# Patient Record
Sex: FEMALE | Race: WHITE | Employment: OTHER | URBAN - METROPOLITAN AREA
[De-identification: names, ages, dates, MRNs, and addresses within clinical notes are randomized per-mention and may not be internally consistent; named-entity substitution may affect disease eponyms.]

---

## 2017-05-15 ENCOUNTER — HOSPITAL ENCOUNTER (OUTPATIENT)
Facility: CLINIC | Age: 77
Discharge: HOME | End: 2017-05-15
Payer: MEDICARE

## 2017-05-15 ENCOUNTER — HOSPITAL ENCOUNTER (OUTPATIENT)
Dept: CT IMAGING | Facility: CLINIC | Age: 77
Discharge: HOME | End: 2017-05-15
Payer: MEDICARE

## 2017-05-15 DIAGNOSIS — R26.9 ABNORMAL GAIT: ICD-10-CM

## 2017-05-15 LAB
ABSOLUTE EOS #: 0.1 K/UL (ref 0–0.4)
ABSOLUTE LYMPH #: 1.2 K/UL (ref 1–4.8)
ABSOLUTE MONO #: 0.3 K/UL (ref 0.1–1.2)
ALBUMIN SERPL-MCNC: 4.1 G/DL (ref 3.5–5.2)
ALBUMIN/GLOBULIN RATIO: 1.5 (ref 1–2.5)
ALP BLD-CCNC: 66 U/L (ref 35–104)
ALT SERPL-CCNC: 21 U/L (ref 5–33)
ANION GAP SERPL CALCULATED.3IONS-SCNC: 11 MMOL/L (ref 9–17)
AST SERPL-CCNC: 25 U/L
BASOPHILS # BLD: 1 %
BASOPHILS ABSOLUTE: 0 K/UL (ref 0–0.2)
BILIRUB SERPL-MCNC: 0.3 MG/DL (ref 0.3–1.2)
BUN BLDV-MCNC: 29 MG/DL (ref 8–23)
BUN/CREAT BLD: ABNORMAL (ref 9–20)
C-REACTIVE PROTEIN: 0.5 MG/L (ref 0–5)
CALCIUM SERPL-MCNC: 9.6 MG/DL (ref 8.6–10.4)
CHLORIDE BLD-SCNC: 104 MMOL/L (ref 98–107)
CO2: 27 MMOL/L (ref 20–31)
CREAT SERPL-MCNC: 1.1 MG/DL (ref 0.5–0.9)
DIFFERENTIAL TYPE: ABNORMAL
EOSINOPHILS RELATIVE PERCENT: 3 %
GFR AFRICAN AMERICAN: 59 ML/MIN
GFR NON-AFRICAN AMERICAN: 48 ML/MIN
GFR SERPL CREATININE-BSD FRML MDRD: ABNORMAL ML/MIN/{1.73_M2}
GFR SERPL CREATININE-BSD FRML MDRD: ABNORMAL ML/MIN/{1.73_M2}
GLUCOSE BLD-MCNC: 97 MG/DL (ref 70–99)
HCT VFR BLD CALC: 36.4 % (ref 36–46)
HEMOGLOBIN: 12.1 G/DL (ref 12–16)
LYMPHOCYTES # BLD: 29 %
MAGNESIUM: 2.1 MG/DL (ref 1.6–2.6)
MCH RBC QN AUTO: 32.7 PG (ref 26–34)
MCHC RBC AUTO-ENTMCNC: 33.3 G/DL (ref 31–37)
MCV RBC AUTO: 98.3 FL (ref 80–100)
MONOCYTES # BLD: 8 %
PDW BLD-RTO: 13.5 % (ref 12.5–15.4)
PLATELET # BLD: 159 K/UL (ref 140–450)
PLATELET ESTIMATE: ABNORMAL
PMV BLD AUTO: 8.9 FL (ref 6–12)
POTASSIUM SERPL-SCNC: 4.7 MMOL/L (ref 3.7–5.3)
RBC # BLD: 3.7 M/UL (ref 4–5.2)
RBC # BLD: ABNORMAL 10*6/UL
SEDIMENTATION RATE, ERYTHROCYTE: 8 MM (ref 0–20)
SEG NEUTROPHILS: 59 %
SEGMENTED NEUTROPHILS ABSOLUTE COUNT: 2.6 K/UL (ref 1.8–7.7)
SODIUM BLD-SCNC: 142 MMOL/L (ref 135–144)
TOTAL PROTEIN: 6.9 G/DL (ref 6.4–8.3)
TROPONIN INTERP: NORMAL
TROPONIN T: <0.03 NG/ML
WBC # BLD: 4.3 K/UL (ref 3.5–11)
WBC # BLD: ABNORMAL 10*3/UL

## 2017-05-15 PROCEDURE — 85025 COMPLETE CBC W/AUTO DIFF WBC: CPT

## 2017-05-15 PROCEDURE — 86140 C-REACTIVE PROTEIN: CPT

## 2017-05-15 PROCEDURE — 80053 COMPREHEN METABOLIC PANEL: CPT

## 2017-05-15 PROCEDURE — 84484 ASSAY OF TROPONIN QUANT: CPT

## 2017-05-15 PROCEDURE — 85651 RBC SED RATE NONAUTOMATED: CPT

## 2017-05-15 PROCEDURE — 83735 ASSAY OF MAGNESIUM: CPT

## 2017-05-15 PROCEDURE — 36415 COLL VENOUS BLD VENIPUNCTURE: CPT

## 2017-05-15 PROCEDURE — 70450 CT HEAD/BRAIN W/O DYE: CPT

## 2020-07-10 ENCOUNTER — OFFICE VISIT (OUTPATIENT)
Dept: UROLOGY | Age: 80
End: 2020-07-10
Payer: MEDICARE

## 2020-07-10 VITALS
WEIGHT: 213 LBS | SYSTOLIC BLOOD PRESSURE: 136 MMHG | DIASTOLIC BLOOD PRESSURE: 78 MMHG | BODY MASS INDEX: 33.43 KG/M2 | HEIGHT: 67 IN | HEART RATE: 53 BPM

## 2020-07-10 PROCEDURE — 99203 OFFICE O/P NEW LOW 30 MIN: CPT | Performed by: UROLOGY

## 2020-07-10 PROCEDURE — 99202 OFFICE O/P NEW SF 15 MIN: CPT | Performed by: UROLOGY

## 2020-07-10 NOTE — PROGRESS NOTES
Vel Spear MD  Urology Clinic Consultation / New Patient Visit    Patient:  Francisco Ya  YOB: 1940  Date: 7/12/2020    HISTORY OF PRESENT ILLNESS:   The patient is a 78 y.o. female who presents today for evaluation of the following problems: Urinary incontinence     1. Urinary incontinence with continuous leakage         Overall the problem(s) : are worsening. Associated Symptoms: No dysuria, gross hematuria. Pain Severity: Pain Score:   0 - No pain    Summary of old records:   Incontinence started 2 years ago. No precipitating factors. She has tried Vesicare in the past for 1 month without success . No symptoms of urge, frequency and stress leak. She mentions it is continuous incontinence without any warning. No history of pelvic surgeries, gynecologic malignancies and radiation in the past.    (Patient's old records, notes and chart reviewed and summarized above.)      Onset 2 years ago  Severity is described as moderate to severe. The course of symptoms over time is chronic. Alleviating factors: none  Worsening factors: Activities  Lower urinary tract symptoms: none     Urinalysis today:  No results found for this visit on 07/10/20. Last BUN and creatinine:  Lab Results   Component Value Date    BUN 29 (H) 05/15/2017     Lab Results   Component Value Date    CREATININE 1.10 (H) 05/15/2017       Imaging Reviewed during this Office Visit:   (results were independently reviewed by physician and radiology report verified)    PAST MEDICAL, FAMILY AND SOCIAL HISTORY:  Past Medical History:   Diagnosis Date    Anemia     pernicious anemia    Eczema     Glaucoma     Osteoarthritis      Past Surgical History:   Procedure Laterality Date    HERNIA REPAIR      KNEE SURGERY       No family history on file.   Outpatient Medications Marked as Taking for the 7/10/20 encounter (Office Visit) with Thuy Kebede MD   Medication Sig Dispense Refill    folic acid (FOLVITE) 1 MG tablet Take 1 mg by mouth 2 times daily.  FLUoxetine (PROZAC) 20 MG capsule Take 20 mg by mouth 2 times daily.  levothyroxine (SYNTHROID) 100 MCG tablet Take 100 mcg by mouth Daily.  simvastatin (ZOCOR) 10 MG tablet Take 10 mg by mouth nightly.  iron polysaccaride (FERREX 150 FORTE PLUS)  MG CAPS Take 1 capsule by mouth daily.  cyanocobalamin 1000 MCG/ML injection Inject 1,000 mcg into the muscle every 30 days.  hydroxychloroquine (PLAQUENIL) 200 MG tablet Take 200 mg by mouth 2 times daily.  cilostazol (PLETAL) 100 MG tablet Take 100 mg by mouth 2 times daily.  DHA-EPA-Flaxseed Oil-Vitamin E (THERA TEARS) CAPS Take  by mouth.  NONFORMULARY betamol drops, 1 drop in each eye twice a day      aspirin 81 MG tablet Take 81 mg by mouth daily. Latex; Codeine; and Vicodin [hydrocodone-acetaminophen]  Social History     Tobacco Use   Smoking Status Never Smoker   Smokeless Tobacco Never Used       Social History     Substance and Sexual Activity   Alcohol Use No       REVIEW OF SYSTEMS:  Constitutional: negative  Eyes: negative  Respiratory: negative  Cardiovascular: negative  Gastrointestinal: negative  Genitourinary: negative except for what is in HPI  Musculoskeletal: negative  Skin: negative   Neurological: negative  Hematological/Lymphatic: negative  Psychological: negative    Physical Exam:    This a 78 y.o. female      Vitals:    07/10/20 1125   BP: 136/78   Pulse: 53     Constitutional: Patient in no acute distress. Neuro: Alert and oriented to person, place and time. Psych: mood and affect normal  HEENT negative  Lungs: Respiratory effort is normal  Cardiovascular: Normal peripheral pulses  Abdomen: Soft, non-tender, non-distended   Lymphatics: No palpable lymphadenopathy. Bladder non-tender and not distended. Assessment and Plan      1.  Urinary incontinence with continuous leakage           Plan:        Continuous incontinence without stress or urge component  Cystoscopy with urodynamics next available               Dipti Medina MD  [facility]    I have discussed the care of this patient including pertinent history and exam findings, with the resident. I have seen and examined the patient and the key elements of all parts of the encounter have been performed by me. I agree with the assessment, plan and orders as documented by the resident.     She Wynne M.D, MD

## 2020-07-17 ENCOUNTER — TELEPHONE (OUTPATIENT)
Dept: UROLOGY | Age: 80
End: 2020-07-17

## 2020-08-14 ENCOUNTER — HOSPITAL ENCOUNTER (OUTPATIENT)
Dept: PREADMISSION TESTING | Age: 80
Discharge: HOME | End: 2020-08-18
Payer: MEDICARE

## 2020-08-14 PROCEDURE — U0003 INFECTIOUS AGENT DETECTION BY NUCLEIC ACID (DNA OR RNA); SEVERE ACUTE RESPIRATORY SYNDROME CORONAVIRUS 2 (SARS-COV-2) (CORONAVIRUS DISEASE [COVID-19]), AMPLIFIED PROBE TECHNIQUE, MAKING USE OF HIGH THROUGHPUT TECHNOLOGIES AS DESCRIBED BY CMS-2020-01-R: HCPCS

## 2020-08-16 LAB — SARS-COV-2, NAA: NOT DETECTED

## 2020-08-17 ENCOUNTER — TELEPHONE (OUTPATIENT)
Dept: PRIMARY CARE CLINIC | Age: 80
End: 2020-08-17

## 2020-08-18 ENCOUNTER — HOSPITAL ENCOUNTER (OUTPATIENT)
Age: 80
Setting detail: OUTPATIENT SURGERY
Discharge: HOME | End: 2020-08-18
Attending: UROLOGY | Admitting: UROLOGY
Payer: MEDICARE

## 2020-08-18 VITALS
OXYGEN SATURATION: 100 % | HEIGHT: 66 IN | WEIGHT: 212 LBS | HEART RATE: 64 BPM | RESPIRATION RATE: 20 BRPM | BODY MASS INDEX: 34.07 KG/M2 | SYSTOLIC BLOOD PRESSURE: 152 MMHG | TEMPERATURE: 97.7 F | DIASTOLIC BLOOD PRESSURE: 88 MMHG

## 2020-08-18 PROCEDURE — 2580000003 HC RX 258: Performed by: UROLOGY

## 2020-08-18 PROCEDURE — 7100000010 HC PHASE II RECOVERY - FIRST 15 MIN: Performed by: UROLOGY

## 2020-08-18 PROCEDURE — 2709999900 HC NON-CHARGEABLE SUPPLY: Performed by: UROLOGY

## 2020-08-18 PROCEDURE — 3600000012 HC SURGERY LEVEL 2 ADDTL 15MIN: Performed by: UROLOGY

## 2020-08-18 PROCEDURE — 3600000002 HC SURGERY LEVEL 2 BASE: Performed by: UROLOGY

## 2020-08-18 RX ORDER — TROSPIUM CHLORIDE ER 60 MG/1
60 CAPSULE ORAL DAILY
Qty: 30 CAPSULE | Refills: 3 | Status: SHIPPED | OUTPATIENT
Start: 2020-08-18

## 2020-08-18 RX ORDER — MAGNESIUM HYDROXIDE 1200 MG/15ML
LIQUID ORAL CONTINUOUS PRN
Status: COMPLETED | OUTPATIENT
Start: 2020-08-18 | End: 2020-08-18

## 2020-08-18 ASSESSMENT — PAIN SCALES - GENERAL: PAINLEVEL_OUTOF10: 0

## 2020-08-18 NOTE — H&P
Relationships    Social connections     Talks on phone: Not on file     Gets together: Not on file     Attends Mosque service: Not on file     Active member of club or organization: Not on file     Attends meetings of clubs or organizations: Not on file     Relationship status: Not on file    Intimate partner violence     Fear of current or ex partner: Not on file     Emotionally abused: Not on file     Physically abused: Not on file     Forced sexual activity: Not on file   Other Topics Concern    Not on file   Social History Narrative    Not on file       Family History:  History reviewed. No pertinent family history. REVIEW OF SYSTEMS:  14 point review of systems negative other than HPI      Physical Exam:    This a 78 y.o. male   Patient Vitals for the past 24 hrs:   BP Temp Temp src Pulse Resp SpO2 Height Weight   08/18/20 0636 (!) 146/87 97.2 °F (36.2 °C) Temporal 82 20 100 % 5' 6\" (1.676 m) 212 lb (96.2 kg)     Constitutional: Patient in no acute distress; Neuro: alert and oriented to person place and time. Psych: Mood and affect normal.  Skin: Normal  Lungs: Respiratory effort normal  Cardiovascular:  Normal peripheral pulses  Abdomen: Soft, non-tender, non-distended   Bladder non-tender and not distended. LE no edema/TTP    LABS:  No results for input(s): WBC, HGB, HCT, MCV, PLT in the last 72 hours. No results for input(s): NA, K, CL, CO2, PHOS, BUN, CREATININE in the last 72 hours. Invalid input(s): CA  No results found for: PSA    Additional Lab/culture results:  None     Urinalysis: No results for input(s): COLORU, PHUR, LABCAST, WBCUA, RBCUA, MUCUS, TRICHOMONAS, YEAST, BACTERIA, CLARITYU, SPECGRAV, LEUKOCYTESUR, UROBILINOGEN, Jyothi Lucille in the last 72 hours.     Invalid input(s): NITRATE, GLUCOSEUKETONESUAMORPHOUS     -----------------------------------------------------------------  Imaging Results:  None     Assessment and Plan   Impression:  There is no problem list

## 2020-08-18 NOTE — OP NOTE
FACILITY:  [address] [place] [address]      DATE:  8/18/2020     Surgeon: Marlo Jara MD    Asst.: Piotr Leon MD    Preoperative diagnosis: urinary incontinence     Postoperative diagnosis: Same    Procedure:   1. Uroflowmetry with measurement of post-void residual. - with intraoperative interpretation   2. Complex cystometrogram with calibrated equipment. 3. Voiding pressure studies.   4. Abdominal pressure calibration (measured rectally). 5. Electromyography.   6. Cystourethroscopy.   7. Pelvic exam    Anesthesia: Local    Specimen: None    Drains: None    Follow-up: In [facility] as scheduled    EBL: 0 ML      Urodynamic Evaluation (8/18/20)  Straight catheterization for Postvoid Residual: 0 mL  Uroflow Study:  Amount Voided: 68.5 mL  Time to Max Flow: 1:13.3 sec  Maximum Flow Rate: 15.7 mL/sec  Average Flow Rate: 1.9 mL/sec (measured over 1:15 sec with only short voiding phase)  CMG with Voiding Pressure Study with intraabdominal probe:  First Sensation: 8 mL  First Urge: 64 mL  Capacity: 69 mL  Interpretation: Low capacity bladder. No demonstrated MAHI. No DO demonstrated on study. Compliance approaches infinity but only small volume infused. VLPP was not measured   Leaking with Valsalva/coughing? No  UPP was not characterized  Surface EMG Normal: no- surface EMG noise throughout study - may suggest dysfunctional voiding     Cystoscopy Operative Note:  Surgeon: German Lin MD  8/18/20  Anesthesia: Urethral 2% Xylocaine  Position: Modified Lithotomy  Findings:   The patient was prepped and draped in the usual sterile fashion. The flexible cystoscope was advanced through the urethra and into the bladder. The bladder was thoroughly inspected and the following was noted:  Vagina: normal appearing vagina with normal color and discharge, no lesions. Urethral caruncle present. No overt cystocele.    Residual Urine: none  Urethra: normal appearing urethra with no masses, tenderness or lesions; good coaptation   Bladder: No tumors or CIS noted. No bladder diverticulum. There was none trabeculation noted. Squamous metaplasia present at bladder neck. Ureters: Clear efflux from both ureters. Orifices with normal configuration and location. The cystoscope was removed. The patient tolerated the procedure well.

## 2020-09-25 ENCOUNTER — OFFICE VISIT (OUTPATIENT)
Dept: UROLOGY | Age: 80
End: 2020-09-25
Payer: MEDICARE

## 2020-09-25 VITALS
WEIGHT: 215.6 LBS | BODY MASS INDEX: 34.65 KG/M2 | DIASTOLIC BLOOD PRESSURE: 83 MMHG | HEART RATE: 68 BPM | HEIGHT: 66 IN | SYSTOLIC BLOOD PRESSURE: 130 MMHG

## 2020-09-25 PROCEDURE — 4040F PNEUMOC VAC/ADMIN/RCVD: CPT | Performed by: UROLOGY

## 2020-09-25 PROCEDURE — 1123F ACP DISCUSS/DSCN MKR DOCD: CPT | Performed by: UROLOGY

## 2020-09-25 PROCEDURE — G8427 DOCREV CUR MEDS BY ELIG CLIN: HCPCS | Performed by: UROLOGY

## 2020-09-25 PROCEDURE — 0509F URINE INCON PLAN DOCD: CPT | Performed by: UROLOGY

## 2020-09-25 PROCEDURE — G8399 PT W/DXA RESULTS DOCUMENT: HCPCS | Performed by: UROLOGY

## 2020-09-25 PROCEDURE — 1036F TOBACCO NON-USER: CPT | Performed by: UROLOGY

## 2020-09-25 PROCEDURE — 1090F PRES/ABSN URINE INCON ASSESS: CPT | Performed by: UROLOGY

## 2020-09-25 PROCEDURE — 99214 OFFICE O/P EST MOD 30 MIN: CPT | Performed by: UROLOGY

## 2020-09-25 PROCEDURE — G8417 CALC BMI ABV UP PARAM F/U: HCPCS | Performed by: UROLOGY

## 2020-09-25 PROCEDURE — 99212 OFFICE O/P EST SF 10 MIN: CPT

## 2020-09-25 NOTE — PROGRESS NOTES
Mari Bragg MD  Urology Clinic follow-up    Patient:  Jie Garcia  YOB: 1940  Date: 9/25/2020    HISTORY OF PRESENT ILLNESS:   The patient is a 78 y.o. female who presents today for evaluation of the following problems: Urinary incontinence     1. Urinary incontinence with continuous leakage    2. Overactive bladder         Overall the problem(s) : are worsening. Associated Symptoms: No dysuria, gross hematuria. Pain Severity: Pain Score:   0 - No pain    Summary of old records:   Incontinence started 2 years ago. No precipitating factors. She has tried Vesicare in the past for 1 month without success . No symptoms of urge, frequency and stress leak. She mentions it is continuous incontinence without any warning. No history of pelvic surgeries, gynecologic malignancies and radiation in the past.    (Patient's old records, notes and chart reviewed and summarized above.)      Onset 2 years ago  Severity is described as moderate to severe. The course of symptoms over time is chronic. Alleviating factors: none, med did not help  Worsening factors: Activities  Lower urinary tract symptoms: none   Cystoscopy with urodynamics in August 2020 showed low capacity low compliance bladder. She has been on Sanctura 60 mg extended release since then. This has not helped at all for her. She is still leaking a lot. Without alert or urge. Urinalysis today:  No results found for this visit on 09/25/20.     Last BUN and creatinine:  Lab Results   Component Value Date    BUN 29 (H) 05/15/2017     Lab Results   Component Value Date    CREATININE 1.10 (H) 05/15/2017       Imaging Reviewed during this Office Visit:   (results were independently reviewed by physician and radiology report verified)    PAST MEDICAL, FAMILY AND SOCIAL HISTORY:  Past Medical History:   Diagnosis Date    Anemia     pernicious anemia    Anemia     Anticardiolipin antibody positive     Antiphospholipid antibody positive     Depression     Dermatitis     Eczema     Glaucoma     Hypergranulation     PG leg ulcer    Hypothyroidism     Macular degeneration     Osteoarthritis     Osteoarthritis     Primary hypercoagulable state ([facility] [address]     Pyoderma gangrenosa     ankle     Past Surgical History:   Procedure Laterality Date    CYSTOGRAPHY N/A 8/18/2020    URODYNAMICS performed by Suzanna Young MD at [address] [place]  8/18/2020    CYSTOSCOPY performed by Suzanna Young MD at [facility]      TOTAL KNEE ARTHROPLASTY Right [address]408    UMBILICAL HERNIA REPAIR       No family history on file. Outpatient Medications Marked as Taking for the 9/25/20 encounter (Office Visit) with Suzanna Young MD   Medication Sig Dispense Refill    trospium (SANCTURA) 60 MG CP24 extended release capsule Take 1 capsule by mouth daily 30 capsule 3    folic acid (FOLVITE) 1 MG tablet Take 1 mg by mouth 2 times daily.  FLUoxetine (PROZAC) 20 MG capsule Take 20 mg by mouth 2 times daily.  levothyroxine (SYNTHROID) 100 MCG tablet Take 100 mcg by mouth Daily.  simvastatin (ZOCOR) 10 MG tablet Take 10 mg by mouth nightly.  iron polysaccaride (FERREX 150 FORTE PLUS)  MG CAPS Take 1 capsule by mouth daily.  cyanocobalamin 1000 MCG/ML injection Inject 1,000 mcg into the muscle every 30 days.  hydroxychloroquine (PLAQUENIL) 200 MG tablet Take 200 mg by mouth 2 times daily.  cilostazol (PLETAL) 100 MG tablet Take 100 mg by mouth 2 times daily.  DHA-EPA-Flaxseed Oil-Vitamin E (THERA TEARS) CAPS Take  by mouth.  NONFORMULARY betamol drops, 1 drop in each eye twice a day         Latex;  Codeine; and Vicodin [hydrocodone-acetaminophen]  Social History     Tobacco Use   Smoking Status Never Smoker   Smokeless Tobacco Never Used       Social History     Substance and Sexual Activity   Alcohol Use No       REVIEW OF SYSTEMS:  Constitutional: negative  Eyes: negative  Respiratory: negative  Cardiovascular: negative  Gastrointestinal: negative  Genitourinary: negative except for what is in HPI  Musculoskeletal: negative  Skin: negative   Neurological: negative  Hematological/Lymphatic: negative  Psychological: negative    Physical Exam:    This a 78 y.o. female      Vitals:    09/25/20 1124   BP: 130/83   Pulse: 68     Constitutional: Patient in no acute distress. Neuro: Alert and oriented to person, place and time. Assessment and Plan      1. Urinary incontinence with continuous leakage    2. Overactive bladder           Plan:        Continuous incontinence without stress or urge component. Urodynamics as noted above. Third line OAB therapy: Patient has failed medications at different dosages and behavioral lifestyle modifications. Discussed risks, benefits, alternatives, possible complications and expectations of both sacral interstim modulator and cystoscopy with Botox injection. Discussed timing of each approach, efficacy, and timeline. Risks: I discussed all the risks, benefits, alternatives and possible complications or surgery as well as expectations and post-op recovery.     Patient is leaning towards Interstim, will schedule stage 1         Booker Crisostomo MD  [facility]rology        Pablo Kilgore M.D, MD

## 2020-09-29 ENCOUNTER — TELEPHONE (OUTPATIENT)
Dept: UROLOGY | Age: 80
End: 2020-09-29

## 2020-09-29 NOTE — TELEPHONE ENCOUNTER
Patient is scheduled for surgery on 10/6 at 11:15AM and for pre admit testing on 10/2 at 9:00AM.  Patient is also scheduled for covid testing on 10/2 at 11:15AM at the [facility]. Talked to patient's daughter Nolvia Lau) and gave her the dates, times and instructions. Antonio Barlow confirmed and verbalized understanding. Patient will have interstim stage 2 on 10/20. Will call Antonio Barlow once we have the time. Baldomero allan [facility] notified and confirmed.

## 2020-09-30 ENCOUNTER — TELEPHONE (OUTPATIENT)
Dept: UROLOGY | Age: 80
End: 2020-09-30

## 2020-09-30 NOTE — TELEPHONE ENCOUNTER
Patient is scheduled for Interstim 2 on 10/23 at 7:30AM and covid testing on 10/19 at 1:15PM. Talked to Lottie Gosselin and gave her the dates, times and instructions. Lottie Gosselin confirmed and verbalized. Letter mailed out today.

## 2020-10-01 RX ORDER — SODIUM CHLORIDE, SODIUM LACTATE, POTASSIUM CHLORIDE, CALCIUM CHLORIDE 600; 310; 30; 20 MG/100ML; MG/100ML; MG/100ML; MG/100ML
1000 INJECTION, SOLUTION INTRAVENOUS CONTINUOUS
Status: CANCELLED | OUTPATIENT
Start: 2020-10-01

## 2020-10-02 ENCOUNTER — HOSPITAL ENCOUNTER (OUTPATIENT)
Dept: PREADMISSION TESTING | Age: 80
Discharge: HOME | End: 2020-10-06
Payer: MEDICARE

## 2020-10-02 VITALS
RESPIRATION RATE: 16 BRPM | OXYGEN SATURATION: 99 % | HEIGHT: 65 IN | WEIGHT: 217 LBS | BODY MASS INDEX: 36.15 KG/M2 | HEART RATE: 71 BPM | TEMPERATURE: 97.5 F | DIASTOLIC BLOOD PRESSURE: 76 MMHG | SYSTOLIC BLOOD PRESSURE: 154 MMHG

## 2020-10-02 LAB
ANION GAP SERPL CALCULATED.3IONS-SCNC: 10 MMOL/L (ref 9–17)
BUN BLDV-MCNC: 35 MG/DL (ref 8–23)
CHLORIDE BLD-SCNC: 106 MMOL/L (ref 98–107)
CO2: 24 MMOL/L (ref 20–31)
CREAT SERPL-MCNC: 1.43 MG/DL (ref 0.5–0.9)
GFR AFRICAN AMERICAN: 43 ML/MIN
GFR NON-AFRICAN AMERICAN: 35 ML/MIN
GFR SERPL CREATININE-BSD FRML MDRD: ABNORMAL ML/MIN/{1.73_M2}
GFR SERPL CREATININE-BSD FRML MDRD: ABNORMAL ML/MIN/{1.73_M2}
GLUCOSE BLD-MCNC: 79 MG/DL (ref 70–99)
HCT VFR BLD CALC: 37.8 % (ref 36.3–47.1)
HEMOGLOBIN: 11.5 G/DL (ref 11.9–15.1)
MCH RBC QN AUTO: 31.6 PG (ref 25.2–33.5)
MCHC RBC AUTO-ENTMCNC: 30.4 G/DL (ref 28.4–34.8)
MCV RBC AUTO: 103.8 FL (ref 82.6–102.9)
NRBC AUTOMATED: 0 PER 100 WBC
PDW BLD-RTO: 13.5 % (ref 11.8–14.4)
PLATELET # BLD: 183 K/UL (ref 138–453)
PMV BLD AUTO: 10.4 FL (ref 8.1–13.5)
POTASSIUM SERPL-SCNC: 4.6 MMOL/L (ref 3.7–5.3)
RBC # BLD: 3.64 M/UL (ref 3.95–5.11)
SODIUM BLD-SCNC: 140 MMOL/L (ref 135–144)
WBC # BLD: 4.3 K/UL (ref 3.5–11.3)

## 2020-10-02 PROCEDURE — 82565 ASSAY OF CREATININE: CPT

## 2020-10-02 PROCEDURE — 80051 ELECTROLYTE PANEL: CPT

## 2020-10-02 PROCEDURE — 36415 COLL VENOUS BLD VENIPUNCTURE: CPT

## 2020-10-02 PROCEDURE — 82947 ASSAY GLUCOSE BLOOD QUANT: CPT

## 2020-10-02 PROCEDURE — 85027 COMPLETE CBC AUTOMATED: CPT

## 2020-10-02 PROCEDURE — 84520 ASSAY OF UREA NITROGEN: CPT

## 2020-10-02 PROCEDURE — 93005 ELECTROCARDIOGRAM TRACING: CPT | Performed by: ANESTHESIOLOGY

## 2020-10-02 RX ORDER — MELOXICAM 15 MG/1
15 TABLET ORAL NIGHTLY
COMMUNITY
Start: 2020-07-22

## 2020-10-02 NOTE — H&P
History and Physical    Pt Name: Natalia Russ  MRN: [id number]  YOB: 1940  Date of evaluation: 10/2/2020  Primary Care Physician: Verónica Whitman MD    SUBJECTIVE:   History of Chief Complaint:    Natalia Russ is a 78 y.o. female who presents for PAT appointment. Patient complains of OAB and urinary incontinence. She has failed conservative treatment. Patient has been scheduled for SACRAL NERVE STIMULATOR IMPLANT STAGE 1 and 2. Allergies  is allergic to latex; codeine; and vicodin [hydrocodone-acetaminophen]. Medications  Prior to Admission medications    Medication Sig Start Date End Date Taking? Authorizing Provider   meloxicam (MOBIC) 15 MG tablet Take 15 mg by mouth nightly 7/22/20   Historical Provider, MD   trospium (SANCTURA) 60 MG CP24 extended release capsule Take 1 capsule by mouth daily 8/18/20   Malia Leach MD   folic acid (FOLVITE) 1 MG tablet Take 1 mg by mouth 2 times daily. Historical Provider, MD   FLUoxetine (PROZAC) 20 MG capsule Take 20 mg by mouth 2 times daily. Historical Provider, MD   levothyroxine (SYNTHROID) 100 MCG tablet Take 150 mcg by mouth Daily     Historical Provider, MD   simvastatin (ZOCOR) 10 MG tablet Take 10 mg by mouth nightly. Historical Provider, MD   iron polysaccaride (FERREX 150 FORTE PLUS)  MG CAPS Take 1 capsule by mouth daily. Historical Provider, MD   cyanocobalamin 1000 MCG/ML injection Inject 1,000 mcg into the muscle every 30 days. Historical Provider, MD   hydroxychloroquine (PLAQUENIL) 200 MG tablet Take 200 mg by mouth 2 times daily. Historical Provider, MD   cilostazol (PLETAL) 100 MG tablet Take 100 mg by mouth 2 times daily. Historical Provider, MD   DHA-EPA-Flaxseed Oil-Vitamin E (THERA TEARS) CAPS Take  by mouth. Historical Provider, MD   NONFORMULARY betamol drops, 1 drop in each eye twice a day    Historical Provider, MD   aspirin 81 MG tablet Take 81 mg by mouth daily.     Historical Provider, MD information about past medical history \"I don't know\". We spoke to her daughter Tamara Dean over the phone (she is her POA)  SKIN:  Warm & dry, no rashes on exposed skin  HEENT: HEAD: Normocephalic, atraumatic        EYES:  PERRL, EOMs intact, conjunctiva clear      EARS:  Equal bilaterally, no edema or thickening, skin is intact without lumps or lesions. No discharge. NOSE:  Nares patent, septum midline, no rhinorrhea      MOUTH/THROAT:  Mucous membranes moist, tongue is pink, uvula midline,  dentures in use upper and lower   NECK:  Supple, no lymphadenopathy, full ROM  LUNGS: Respirations even and non-labored. Clear to auscultation bilaterally, no wheezes/rales/rhonchi   CARDIOVASCULAR: slight irregularity, no murmurs/rubs/gallops   ABDOMEN: soft, non-tender, non-distended, bowel sounds active x 4, obese  MUSCULOSKELETAL: Full ROM bilateral upper extremities. Full ROM bilateral lower extremities. Strength of 5/5 bilateral upper extremities. Strength 5/5 bilateral lower extremities. VASCULAR:  Brisk cap refill bilateral fingers. Radial pulse 2+ bilaterally. Dorsalis pedis pulse 2+ bilaterally. No edema BLE. Varicose veins BLE  NEUROLOGIC: CN II-XII are grossly intact. Gait not assessed.     Testing:   EKG: 10/2/20  Labs pending: drawn 10/2/2020     IMPRESSIONS:   Urinary incontinence  OAB      Diagnosis Date    Anemia     pernicious anemia    Anticardiolipin antibody positive     Antiphospholipid antibody positive     Anxiety     Colon polyps     Depression     Dermatitis     Eczema     Glaucoma     Hypergranulation     PG leg ulcer    Hypothyroidism     Macular degeneration     Memory difficulty     was not able to relay any PMH day of PAT appt, we spoke to her daughter/POA Edwardo Carson on the phone,she will be with her day of surgery    OAB (overactive bladder)     Osteoarthritis     Poor historian     Primary hypercoagulable state ([facility] [address]     Pyoderma gangrenosa     ankle    Urinary incontinence     Wears dentures     Wears glasses     Wellness examination     pcp-Dr Macedo-last visit sept 2020    Wellness examination     dermatologist-Dr Schmidt     PLANS:   SACRAL NERVE STIMULATOR IMPLANT STAGE  1 and 2    BRANDYN GERMAN- CNP  Electronically signed 10/2/2020 at 11:27 AM

## 2020-10-02 NOTE — H&P (VIEW-ONLY)
History and Physical    Pt Name: Murtaza Chen  MRN: [id number]  YOB: 1940  Date of evaluation: 10/2/2020  Primary Care Physician: Vasquez Mancuso MD    SUBJECTIVE:   History of Chief Complaint:    Murtaza Chen is a 78 y.o. female who presents for PAT appointment. Patient complains of OAB and urinary incontinence. She has failed conservative treatment. Patient has been scheduled for SACRAL NERVE STIMULATOR IMPLANT STAGE 1 and 2. Allergies  is allergic to latex; codeine; and vicodin [hydrocodone-acetaminophen]. Medications  Prior to Admission medications    Medication Sig Start Date End Date Taking? Authorizing Provider   meloxicam (MOBIC) 15 MG tablet Take 15 mg by mouth nightly 7/22/20   Historical Provider, MD   trospium (SANCTURA) 60 MG CP24 extended release capsule Take 1 capsule by mouth daily 8/18/20   Clive Casey MD   folic acid (FOLVITE) 1 MG tablet Take 1 mg by mouth 2 times daily. Historical Provider, MD   FLUoxetine (PROZAC) 20 MG capsule Take 20 mg by mouth 2 times daily. Historical Provider, MD   levothyroxine (SYNTHROID) 100 MCG tablet Take 150 mcg by mouth Daily     Historical Provider, MD   simvastatin (ZOCOR) 10 MG tablet Take 10 mg by mouth nightly. Historical Provider, MD   iron polysaccaride (FERREX 150 FORTE PLUS)  MG CAPS Take 1 capsule by mouth daily. Historical Provider, MD   cyanocobalamin 1000 MCG/ML injection Inject 1,000 mcg into the muscle every 30 days. Historical Provider, MD   hydroxychloroquine (PLAQUENIL) 200 MG tablet Take 200 mg by mouth 2 times daily. Historical Provider, MD   cilostazol (PLETAL) 100 MG tablet Take 100 mg by mouth 2 times daily. Historical Provider, MD   DHA-EPA-Flaxseed Oil-Vitamin E (THERA TEARS) CAPS Take  by mouth. Historical Provider, MD   NONFORMULARY betamol drops, 1 drop in each eye twice a day    Historical Provider, MD   aspirin 81 MG tablet Take 81 mg by mouth daily.     Historical Provider, MD Past Medical History    has a past medical history of Anemia, Anticardiolipin antibody positive, Antiphospholipid antibody positive, Anxiety, Colon polyps, Depression, Dermatitis, Eczema, Glaucoma, Hypergranulation, Hypothyroidism, Macular degeneration, Memory difficulty, OAB (overactive bladder), Osteoarthritis, Poor historian, Primary hypercoagulable state ([facility] [address] Pyoderma gangrenosa, Urinary incontinence, Wears dentures, Wears glasses, Wellness examination, and Wellness examination. Past Surgical History   has a past surgical history that includes hernia repair; knee surgery; Total knee arthroplasty (Right, 2018); Umbilical hernia repair; Cystography (N/A, 2020); Cystoscopy (2020); and Colonoscopy (). Social History   reports that she has never smoked. She has never used smokeless tobacco.    reports no history of alcohol use. reports no history of drug use. Marital Status single  Children 4  Occupation \" I don't remember\". Family History  Family Status   Relation Name Status    Mother      Father       family history includes No Known Problems in her father and mother. Review of Systems:  CONSTITUTIONAL:   negative for fevers, chills, fatigue and malaise +memory difficulties    EYES:   negative for double vision, blurred vision and photophobia    HEENT:   negative for tinnitus, epistaxis and sore throat     RESPIRATORY:   negative for cough, shortness of breath, wheezing     CARDIOVASCULAR:   negative for chest pain, palpitations, syncope, edema     GASTROINTESTINAL:   negative for nausea, vomiting     GENITOURINARY:   negative for incontinence     MUSCULOSKELETAL:   negative for neck or back pain     NEUROLOGICAL:   Negative for weakness and tingling  negative for headaches and dizziness     PSYCHIATRIC:   negative for anxiety       OBJECTIVE:   VITALS: per epic [place] and orientated to person, place and time.  But not able to offer any information about past medical history \"I don't know\". We spoke to her daughter Ambika Cali over the phone (she is her POA)  SKIN:  Warm & dry, no rashes on exposed skin  HEENT: HEAD: Normocephalic, atraumatic        EYES:  PERRL, EOMs intact, conjunctiva clear      EARS:  Equal bilaterally, no edema or thickening, skin is intact without lumps or lesions. No discharge. NOSE:  Nares patent, septum midline, no rhinorrhea      MOUTH/THROAT:  Mucous membranes moist, tongue is pink, uvula midline,  dentures in use upper and lower   NECK:  Supple, no lymphadenopathy, full ROM  LUNGS: Respirations even and non-labored. Clear to auscultation bilaterally, no wheezes/rales/rhonchi   CARDIOVASCULAR: slight irregularity, no murmurs/rubs/gallops   ABDOMEN: soft, non-tender, non-distended, bowel sounds active x 4, obese  MUSCULOSKELETAL: Full ROM bilateral upper extremities. Full ROM bilateral lower extremities. Strength of 5/5 bilateral upper extremities. Strength 5/5 bilateral lower extremities. VASCULAR:  Brisk cap refill bilateral fingers. Radial pulse 2+ bilaterally. Dorsalis pedis pulse 2+ bilaterally. No edema BLE. Varicose veins BLE  NEUROLOGIC: CN II-XII are grossly intact. Gait not assessed.     Testing:   EKG: 10/2/20  Labs pending: drawn 10/2/2020     IMPRESSIONS:   Urinary incontinence  OAB      Diagnosis Date    Anemia     pernicious anemia    Anticardiolipin antibody positive     Antiphospholipid antibody positive     Anxiety     Colon polyps     Depression     Dermatitis     Eczema     Glaucoma     Hypergranulation     PG leg ulcer    Hypothyroidism     Macular degeneration     Memory difficulty     was not able to relay any PMH day of PAT appt, we spoke to her daughter/POJED Ace on the phone,she will be with her day of surgery    OAB (overactive bladder)     Osteoarthritis     Poor historian     Primary hypercoagulable state ([facility] [address]     Pyoderma gangrenosa     ankle    Urinary incontinence     Wears dentures     Wears glasses     Wellness examination     pcp-Dr Macedo-last visit sept 2020    Wellness examination     dermatologist-Dr Schmidt     PLANS:   SACRAL NERVE STIMULATOR IMPLANT STAGE  1 and 2    BRANDYN GERMAN- CNP  Electronically signed 10/2/2020 at 11:27 AM

## 2020-10-02 NOTE — TELEPHONE ENCOUNTER
Called [facility]) to let her know that patient needs to stop taking pleta and plaquenil 3 days before surgery on Tuesday per Dr Venkat Bartlett office.

## 2020-10-02 NOTE — PROGRESS NOTES
Anesthesia contacted:   Yes. I spoke with Dr. Alivia Stewart about pt history and findings. Medical or cardiac clearance ordered: medical with cardiac attention from Dr. Claudia Clark. Also I spoke with Dr. Kodi Ferris office re: patient taking Pleta and Plaquenil and her last dose was this a.m (surgery this upcoming Tuesday). Pt has already stopped aspirin and mobic per the daughter. Surgeon office to call back with further instruction. Addendum: I spoke with Cristo Kimble (POA/ daughter). Informed her we have not received further instruction on above.    BRANDYN BEY CNP  10/2/20  11:34 AM

## 2020-10-03 LAB
EKG ATRIAL RATE: 64 BPM
EKG P AXIS: 61 DEGREES
EKG P-R INTERVAL: 196 MS
EKG Q-T INTERVAL: 466 MS
EKG QRS DURATION: 104 MS
EKG QTC CALCULATION (BAZETT): 480 MS
EKG R AXIS: -7 DEGREES
EKG T AXIS: 51 DEGREES
EKG VENTRICULAR RATE: 64 BPM

## 2020-10-04 ENCOUNTER — TELEPHONE (OUTPATIENT)
Dept: PRIMARY CARE CLINIC | Age: 80
End: 2020-10-04

## 2020-10-07 ENCOUNTER — TELEPHONE (OUTPATIENT)
Dept: UROLOGY | Age: 80
End: 2020-10-07

## 2020-10-07 NOTE — TELEPHONE ENCOUNTER
Patient is scheduled for Interstim 1 on 10/26 at 11:00AM and for Interstim 2 on 11/9 at 8:15AM.  Patient is also scheduled for covid testing on 10/22 at 10:30AM and on 11/5 at 10:00AM at the [facility]. Talked to patient and gave her the dates, times and instructions. Patient confirmed and verbalized understanding. Letter mailed out today.

## 2020-10-22 ENCOUNTER — HOSPITAL ENCOUNTER (OUTPATIENT)
Dept: PREADMISSION TESTING | Age: 80
Setting detail: SPECIMEN
Discharge: HOME | End: 2020-10-26
Payer: MEDICARE

## 2020-10-22 PROCEDURE — U0003 INFECTIOUS AGENT DETECTION BY NUCLEIC ACID (DNA OR RNA); SEVERE ACUTE RESPIRATORY SYNDROME CORONAVIRUS 2 (SARS-COV-2) (CORONAVIRUS DISEASE [COVID-19]), AMPLIFIED PROBE TECHNIQUE, MAKING USE OF HIGH THROUGHPUT TECHNOLOGIES AS DESCRIBED BY CMS-2020-01-R: HCPCS

## 2020-10-23 LAB — SARS-COV-2, NAA: NOT DETECTED

## 2020-10-26 ENCOUNTER — ANESTHESIA (OUTPATIENT)
Dept: OPERATING ROOM | Age: 80
End: 2020-10-26
Payer: MEDICARE

## 2020-10-26 ENCOUNTER — HOSPITAL ENCOUNTER (OUTPATIENT)
Age: 80
Setting detail: OUTPATIENT SURGERY
Discharge: HOME | End: 2020-10-26
Attending: UROLOGY | Admitting: UROLOGY
Payer: MEDICARE

## 2020-10-26 ENCOUNTER — APPOINTMENT (OUTPATIENT)
Dept: GENERAL RADIOLOGY | Age: 80
End: 2020-10-26
Attending: UROLOGY
Payer: MEDICARE

## 2020-10-26 ENCOUNTER — ANESTHESIA EVENT (OUTPATIENT)
Dept: OPERATING ROOM | Age: 80
End: 2020-10-26
Payer: MEDICARE

## 2020-10-26 VITALS
TEMPERATURE: 96.8 F | WEIGHT: 213 LBS | DIASTOLIC BLOOD PRESSURE: 83 MMHG | OXYGEN SATURATION: 82 % | HEIGHT: 66 IN | BODY MASS INDEX: 34.23 KG/M2 | SYSTOLIC BLOOD PRESSURE: 147 MMHG | HEART RATE: 57 BPM | RESPIRATION RATE: 16 BRPM

## 2020-10-26 VITALS — SYSTOLIC BLOOD PRESSURE: 130 MMHG | DIASTOLIC BLOOD PRESSURE: 114 MMHG | OXYGEN SATURATION: 96 %

## 2020-10-26 PROCEDURE — 3700000001 HC ADD 15 MINUTES (ANESTHESIA): Performed by: UROLOGY

## 2020-10-26 PROCEDURE — C1883 ADAPT/EXT, PACING/NEURO LEAD: HCPCS | Performed by: UROLOGY

## 2020-10-26 PROCEDURE — 2500000003 HC RX 250 WO HCPCS: Performed by: NURSE ANESTHETIST, CERTIFIED REGISTERED

## 2020-10-26 PROCEDURE — 2580000003 HC RX 258: Performed by: UROLOGY

## 2020-10-26 PROCEDURE — 3600000013 HC SURGERY LEVEL 3 ADDTL 15MIN: Performed by: UROLOGY

## 2020-10-26 PROCEDURE — 3209999900 FLUORO FOR SURGICAL PROCEDURES

## 2020-10-26 PROCEDURE — 3700000000 HC ANESTHESIA ATTENDED CARE: Performed by: UROLOGY

## 2020-10-26 PROCEDURE — 2709999900 HC NON-CHARGEABLE SUPPLY: Performed by: UROLOGY

## 2020-10-26 PROCEDURE — 6360000002 HC RX W HCPCS: Performed by: NURSE ANESTHETIST, CERTIFIED REGISTERED

## 2020-10-26 PROCEDURE — 7100000041 HC SPAR PHASE II RECOVERY - ADDTL 15 MIN: Performed by: UROLOGY

## 2020-10-26 PROCEDURE — 2500000003 HC RX 250 WO HCPCS: Performed by: UROLOGY

## 2020-10-26 PROCEDURE — 3600000003 HC SURGERY LEVEL 3 BASE: Performed by: UROLOGY

## 2020-10-26 PROCEDURE — C1778 LEAD, NEUROSTIMULATOR: HCPCS | Performed by: UROLOGY

## 2020-10-26 PROCEDURE — 2720000010 HC SURG SUPPLY STERILE: Performed by: UROLOGY

## 2020-10-26 PROCEDURE — 6360000002 HC RX W HCPCS: Performed by: PHYSICIAN ASSISTANT

## 2020-10-26 PROCEDURE — 7100000040 HC SPAR PHASE II RECOVERY - FIRST 15 MIN: Performed by: UROLOGY

## 2020-10-26 DEVICE — EXTENSION LD PERC INTRSTM SCAN SURE MRI: Type: IMPLANTABLE DEVICE | Site: SACRUM | Status: FUNCTIONAL

## 2020-10-26 RX ORDER — FENTANYL CITRATE 50 UG/ML
INJECTION, SOLUTION INTRAMUSCULAR; INTRAVENOUS PRN
Status: DISCONTINUED | OUTPATIENT
Start: 2020-10-26 | End: 2020-10-26 | Stop reason: SDUPTHER

## 2020-10-26 RX ORDER — SODIUM CHLORIDE 0.9 % (FLUSH) 0.9 %
10 SYRINGE (ML) INJECTION EVERY 12 HOURS SCHEDULED
Status: DISCONTINUED | OUTPATIENT
Start: 2020-10-26 | End: 2020-10-26 | Stop reason: HOSPADM

## 2020-10-26 RX ORDER — CEPHALEXIN 500 MG/1
500 CAPSULE ORAL 3 TIMES DAILY
Qty: 21 CAPSULE | Refills: 0 | Status: SHIPPED | OUTPATIENT
Start: 2020-10-26 | End: 2020-11-02

## 2020-10-26 RX ORDER — TRAMADOL HYDROCHLORIDE 50 MG/1
50 TABLET ORAL EVERY 6 HOURS PRN
Qty: 20 TABLET | Refills: 0 | Status: SHIPPED | OUTPATIENT
Start: 2020-10-26 | End: 2020-10-31

## 2020-10-26 RX ORDER — PROPOFOL 10 MG/ML
INJECTION, EMULSION INTRAVENOUS PRN
Status: DISCONTINUED | OUTPATIENT
Start: 2020-10-26 | End: 2020-10-26 | Stop reason: SDUPTHER

## 2020-10-26 RX ORDER — MIDAZOLAM HYDROCHLORIDE 1 MG/ML
2 INJECTION INTRAMUSCULAR; INTRAVENOUS
Status: DISCONTINUED | OUTPATIENT
Start: 2020-10-26 | End: 2020-10-26 | Stop reason: HOSPADM

## 2020-10-26 RX ORDER — FENTANYL CITRATE 50 UG/ML
25 INJECTION, SOLUTION INTRAMUSCULAR; INTRAVENOUS ONCE
Status: DISCONTINUED | OUTPATIENT
Start: 2020-10-26 | End: 2020-10-26 | Stop reason: HOSPADM

## 2020-10-26 RX ORDER — LIDOCAINE HYDROCHLORIDE 10 MG/ML
INJECTION, SOLUTION EPIDURAL; INFILTRATION; INTRACAUDAL; PERINEURAL PRN
Status: DISCONTINUED | OUTPATIENT
Start: 2020-10-26 | End: 2020-10-26 | Stop reason: SDUPTHER

## 2020-10-26 RX ORDER — SODIUM CHLORIDE, SODIUM LACTATE, POTASSIUM CHLORIDE, CALCIUM CHLORIDE 600; 310; 30; 20 MG/100ML; MG/100ML; MG/100ML; MG/100ML
INJECTION, SOLUTION INTRAVENOUS CONTINUOUS
Status: DISCONTINUED | OUTPATIENT
Start: 2020-10-26 | End: 2020-10-26 | Stop reason: SDUPTHER

## 2020-10-26 RX ORDER — ONDANSETRON 2 MG/ML
4 INJECTION INTRAMUSCULAR; INTRAVENOUS ONCE
Status: DISCONTINUED | OUTPATIENT
Start: 2020-10-26 | End: 2020-10-26 | Stop reason: HOSPADM

## 2020-10-26 RX ORDER — SODIUM CHLORIDE 0.9 % (FLUSH) 0.9 %
10 SYRINGE (ML) INJECTION PRN
Status: DISCONTINUED | OUTPATIENT
Start: 2020-10-26 | End: 2020-10-26 | Stop reason: HOSPADM

## 2020-10-26 RX ORDER — GLYCOPYRROLATE 1 MG/5 ML
SYRINGE (ML) INTRAVENOUS PRN
Status: DISCONTINUED | OUTPATIENT
Start: 2020-10-26 | End: 2020-10-26 | Stop reason: SDUPTHER

## 2020-10-26 RX ORDER — BUPIVACAINE HYDROCHLORIDE AND EPINEPHRINE 2.5; 5 MG/ML; UG/ML
INJECTION, SOLUTION EPIDURAL; INFILTRATION; INTRACAUDAL; PERINEURAL PRN
Status: DISCONTINUED | OUTPATIENT
Start: 2020-10-26 | End: 2020-10-26 | Stop reason: ALTCHOICE

## 2020-10-26 RX ORDER — FENTANYL CITRATE 50 UG/ML
50 INJECTION, SOLUTION INTRAMUSCULAR; INTRAVENOUS EVERY 5 MIN PRN
Status: DISCONTINUED | OUTPATIENT
Start: 2020-10-26 | End: 2020-10-26 | Stop reason: HOSPADM

## 2020-10-26 RX ORDER — WATER 1000 ML/1000ML
INJECTION, SOLUTION INTRAVENOUS PRN
Status: DISCONTINUED | OUTPATIENT
Start: 2020-10-26 | End: 2020-10-26 | Stop reason: ALTCHOICE

## 2020-10-26 RX ORDER — ONDANSETRON 2 MG/ML
4 INJECTION INTRAMUSCULAR; INTRAVENOUS
Status: DISCONTINUED | OUTPATIENT
Start: 2020-10-26 | End: 2020-10-26 | Stop reason: HOSPADM

## 2020-10-26 RX ORDER — SODIUM CHLORIDE, SODIUM LACTATE, POTASSIUM CHLORIDE, CALCIUM CHLORIDE 600; 310; 30; 20 MG/100ML; MG/100ML; MG/100ML; MG/100ML
INJECTION, SOLUTION INTRAVENOUS CONTINUOUS
Status: DISCONTINUED | OUTPATIENT
Start: 2020-10-26 | End: 2020-10-26 | Stop reason: HOSPADM

## 2020-10-26 RX ADMIN — SODIUM CHLORIDE, POTASSIUM CHLORIDE, SODIUM LACTATE AND CALCIUM CHLORIDE: 600; 310; 30; 20 INJECTION, SOLUTION INTRAVENOUS at 10:18

## 2020-10-26 RX ADMIN — FENTANYL CITRATE 25 MCG: 50 INJECTION INTRAMUSCULAR; INTRAVENOUS at 12:52

## 2020-10-26 RX ADMIN — FENTANYL CITRATE 25 MCG: 50 INJECTION INTRAMUSCULAR; INTRAVENOUS at 12:39

## 2020-10-26 RX ADMIN — PROPOFOL 40 MG: 10 INJECTION, EMULSION INTRAVENOUS at 12:24

## 2020-10-26 RX ADMIN — PROPOFOL 50 MG: 10 INJECTION, EMULSION INTRAVENOUS at 12:19

## 2020-10-26 RX ADMIN — PROPOFOL 30 MG: 10 INJECTION, EMULSION INTRAVENOUS at 13:02

## 2020-10-26 RX ADMIN — PROPOFOL 30 MG: 10 INJECTION, EMULSION INTRAVENOUS at 12:55

## 2020-10-26 RX ADMIN — FENTANYL CITRATE 25 MCG: 50 INJECTION INTRAMUSCULAR; INTRAVENOUS at 12:21

## 2020-10-26 RX ADMIN — LIDOCAINE HYDROCHLORIDE 50 MG: 10 INJECTION, SOLUTION EPIDURAL; INFILTRATION; INTRACAUDAL; PERINEURAL at 12:13

## 2020-10-26 RX ADMIN — PROPOFOL 30 MG: 10 INJECTION, EMULSION INTRAVENOUS at 12:32

## 2020-10-26 RX ADMIN — CEFAZOLIN 2 G: 10 INJECTION, POWDER, FOR SOLUTION INTRAVENOUS at 12:16

## 2020-10-26 RX ADMIN — FENTANYL CITRATE 25 MCG: 50 INJECTION INTRAMUSCULAR; INTRAVENOUS at 12:13

## 2020-10-26 RX ADMIN — PROPOFOL 25 MG: 10 INJECTION, EMULSION INTRAVENOUS at 12:49

## 2020-10-26 RX ADMIN — PROPOFOL 40 MG: 10 INJECTION, EMULSION INTRAVENOUS at 12:13

## 2020-10-26 RX ADMIN — Medication 0.2 MG: at 12:26

## 2020-10-26 ASSESSMENT — PULMONARY FUNCTION TESTS
PIF_VALUE: 0

## 2020-10-26 ASSESSMENT — PAIN SCALES - GENERAL
PAINLEVEL_OUTOF10: 0

## 2020-10-26 ASSESSMENT — PAIN - FUNCTIONAL ASSESSMENT: PAIN_FUNCTIONAL_ASSESSMENT: 0-10

## 2020-10-26 NOTE — INTERVAL H&P NOTE
Update History & Physical    The patient's History and Physical of October 2, 2020 was reviewed with the patient and I examined the patient. There was no change. The surgical site was confirmed by the patient and me. Here for interstim stage one for urinary incontinence and OAB. Plan: The risks, benefits, expected outcome, and alternative to the recommended procedure have been discussed with the patient. Patient understands and wants to proceed with the procedure. The patient was counseled at length about the risks of barry Covid-19 during their perioperative period and any recovery window from their procedure. The patient was made aware that barry Covid-19  may worsen their prognosis for recovering from their procedure  and lend to a higher morbidity and/or mortality risk. All material risks, benefits, and reasonable alternatives including postponing the procedure were discussed. The patient does wish to proceed with the procedure at this time.           Electronically signed by Elmer Baldwin PA-C on 10/26/2020 at 9:11 AM

## 2020-10-26 NOTE — ANESTHESIA POSTPROCEDURE EVALUATION
Department of Anesthesiology  Postprocedure Note    Patient: Chelo Hebert  MRN: [id number]  YOB: 1940  Date of evaluation: 10/26/2020  Time:  1:52 PM     Procedure Summary     Date:  10/26/20 Room / Location:  [address]    Anesthesia Start:  1207 Anesthesia Stop:  4016    Procedure:  SACRAL NERVE STIMULATOR IMPLANT STAGE 1 WITH INTRAOPERATIVE PROGRAMMING (N/A ) Diagnosis:  (OVERACTIVE BLADDER, INCONTINENCE)    Surgeon:  Brenda Hernandes MD Responsible Provider:  Jeniffer Bradshaw MD    Anesthesia Type:  MAC ASA Status:  2          Anesthesia Type: MAC    Murray Phase I: Murray Score: 10    Murray Phase II: Murray Score: 10    Last vitals: Reviewed and per EMR flowsheets.        Anesthesia Post Evaluation    Patient location during evaluation: PACU  Patient participation: complete - patient participated  Level of consciousness: awake  Pain score: 3  Airway patency: patent  Nausea & Vomiting: no vomiting and no nausea  Complications: no  Cardiovascular status: blood pressure returned to baseline  Respiratory status: acceptable  Hydration status: euvolemic

## 2020-10-26 NOTE — ANESTHESIA PRE PROCEDURE
Department of Anesthesiology  Preprocedure Note       Name:  Gwendolyn Regalado   Age:  78 y.o.  :  1940                                          MRN:  [id number]         Date:  10/26/2020      Surgeon: Leyla Laughlin):  Rebekah Short MD    Procedure: Procedure(s):  SACRAL NERVE STIMULATOR IMPLANT PART 1  C-ARM (REP NOTIFIED BY CLINIC)    Medications prior to admission:   Prior to Admission medications    Medication Sig Start Date End Date Taking? Authorizing Provider   trospium (SANCTURA) 60 MG CP24 extended release capsule Take 1 capsule by mouth daily 20  Yes Reagan Avelar MD   folic acid (FOLVITE) 1 MG tablet Take 1 mg by mouth 2 times daily. Yes Historical Provider, MD   FLUoxetine (PROZAC) 20 MG capsule Take 20 mg by mouth 2 times daily. Yes Historical Provider, MD   levothyroxine (SYNTHROID) 100 MCG tablet Take 150 mcg by mouth Daily    Yes Historical Provider, MD   simvastatin (ZOCOR) 10 MG tablet Take 10 mg by mouth nightly. Yes Historical Provider, MD   iron polysaccaride (FERREX 150 FORTE PLUS)  MG CAPS Take 1 capsule by mouth daily. Yes Historical Provider, MD   cyanocobalamin 1000 MCG/ML injection Inject 1,000 mcg into the muscle every 30 days. Yes Historical Provider, MD   DHA-EPA-Flaxseed Oil-Vitamin E (THERA TEARS) CAPS Take  by mouth. Yes Historical Provider, MD   NONFORMULARY betamol drops, 1 drop in each eye twice a day   Yes Historical Provider, MD   meloxicam (MOBIC) 15 MG tablet Take 15 mg by mouth nightly 20   Historical Provider, MD   hydroxychloroquine (PLAQUENIL) 200 MG tablet Take 200 mg by mouth 2 times daily. Historical Provider, MD   cilostazol (PLETAL) 100 MG tablet Take 100 mg by mouth 2 times daily. Historical Provider, MD   aspirin 81 MG tablet Take 81 mg by mouth daily.     Historical Provider, MD       Current medications:    Current Facility-Administered Medications   Medication Dose Route Frequency Provider Last Rate Last Dose    ceFAZolin (ANCEF) 2 g in dextrose 5 % 50 mL IVPB  2 g Intravenous On Call to [facility],7Gws, PA-C        lactated ringers infusion   Intravenous Continuous Jin Canela  mL/hr at 10/26/20 1018      fentaNYL (SUBLIMAZE) injection 50 mcg  50 mcg Intravenous Q5 Min PRN Erick Lala MD        ondansetron [facility]) injection 4 mg  4 mg Intravenous Once PRN Erick Lala MD           Allergies: Allergies   Allergen Reactions    Latex     Codeine     Vicodin [Hydrocodone-Acetaminophen]        Problem List:  There is no problem list on file for this patient. Past Medical History:        Diagnosis Date    Anemia     pernicious anemia    Anticardiolipin antibody positive     Antiphospholipid antibody positive     Anxiety     Colon polyps     Depression     Dermatitis     Eczema     Glaucoma     Hypergranulation     PG leg ulcer    Hypothyroidism     Macular degeneration     Memory difficulty     was not able to relay any PMH day of PAT appt, we spoke to her daughter/ZEINAB Balderas on the phone,she will be with her day of surgery    OAB (overactive bladder)     Osteoarthritis     Poor historian     Primary hypercoagulable state ([address]     Pyoderma gangrenosa     ankle    Urinary incontinence     Wears dentures     Wears glasses     Wellness examination     pcp-Dr Macedo-last visit sept 2020    Wellness examination     dermatologist-Dr Mishra       Past Surgical History:        Procedure Laterality Date    COLONOSCOPY  2020    x2    CYSTOGRAPHY N/A 8/18/2020    URODYNAMICS performed by Tony White MD at [address] [place]  8/18/2020    CYSTOSCOPY performed by Tony White MD at [address] TOTAL KNEE ARTHROPLASTY Right 1698    UMBILICAL HERNIA REPAIR         Social History:    Social History     Tobacco Use    Smoking status: Never Smoker    Smokeless tobacco: Never Used   Substance Use Topics    Alcohol use:  No Counseling given: Not Answered      Vital Signs (Current):   Vitals:    10/26/20 0958 10/26/20 1010   BP:  (!) 176/78   Pulse:  92   Resp:  16   Temp:  97.3 °F (36.3 °C)   TempSrc:  Temporal   SpO2:  97%   Weight: 213 lb (96.6 kg)    Height: 5' 6\" (1.676 m)                                               BP Readings from Last 3 Encounters:   10/26/20 (!) 176/78   10/02/20 (!) 154/76   09/25/20 130/83       NPO Status: Time of last liquid consumption: 2000                        Time of last solid consumption: 2000                        Date of last liquid consumption: 10/25/20                        Date of last solid food consumption: 10/25/20    BMI:   Wt Readings from Last 3 Encounters:   10/26/20 213 lb (96.6 kg)   10/06/20 213 lb 8 oz (96.8 kg)   10/02/20 217 lb (98.4 kg)     Body mass index is 34.38 kg/m². CBC:   Lab Results   Component Value Date    WBC 4.3 10/02/2020    RBC 3.64 10/02/2020    HGB 11.5 10/02/2020    HCT 37.8 10/02/2020    .8 10/02/2020    RDW 13.5 10/02/2020     10/02/2020       CMP:   Lab Results   Component Value Date     10/02/2020    K 4.6 10/02/2020     10/02/2020    CO2 24 10/02/2020    BUN 35 10/02/2020    CREATININE 1.43 10/02/2020    GFRAA 43 10/02/2020    LABGLOM 35 10/02/2020    GLUCOSE 79 10/02/2020    PROT 6.9 05/15/2017    CALCIUM 9.6 05/15/2017    BILITOT 0.30 05/15/2017    ALKPHOS 66 05/15/2017    AST 25 05/15/2017    ALT 21 05/15/2017       POC Tests: No results for input(s): POCGLU, POCNA, POCK, POCCL, POCBUN, POCHEMO, POCHCT in the last 72 hours.     Coags: No results found for: PROTIME, INR, APTT    HCG (If Applicable): No results found for: PREGTESTUR, PREGSERUM, HCG, HCGQUANT     ABGs: No results found for: PHART, PO2ART, SIX0BDC, XAS3ZPJ, BEART, L5VRVUTF     Type & Screen (If Applicable):  No results found for: LABABO, LABRH    Drug/Infectious Status (If Applicable):  No results found for: HIV, HEPCAB    COVID-19 Screening (If

## 2020-10-26 NOTE — PROGRESS NOTES
Progress Notes by Weiler, Keith E., MD at 03/19/18 08:27 PM     Author:  Weiler, Keith E., MD Service:  (none) Author Type:  Physician     Filed:  03/19/18 08:29 PM Encounter Date:  3/19/2018 Status:  Signed     :  Weiler, Keith E., MD (Physician)            Kenton is a 23 year old male who presents to the Immediate Care for[KW1.1T] anxiety.  He has a history of anxiety and is seeing his PCP for this.  He was on Lexapro as well as Xanax as needed.  He has not seen his PCP in about 3 months.  He has run out of his meds for at least the last month.  He has just been feeling more anxious over the last couple of weeks.  He is having difficulty sleeping at night.  He denies any suicidal ideation.  He denies any depressive symptoms.  Just feels anxious and has difficulty sleeping.  He is otherwise well.[KW1.1M]    PMH:   see chart   Alg:      Review of patient's allergies indicates no known allergies.   Meds:  see chart   Tobacco Use:[KW1.1T] none[KW1.1M]     OBJECTIVE:  /82 mmHg  Pulse 71  Temp(Src) 98.1 °F (36.7 °C) (Tympanic)  Resp 16  SpO2 96%  Gen:[KW1.1T] alert & oriented, pleasant and comfortable[KW1.1M]  HEENT: no conjunctival injection or scleral icterus         no pharyngeal erythema         mucous-membranes moist  Neck:  supple, no masses or thyromegaly         no lymphadenopathy  Lungs: Clear to auscultation; good air entry         no use of accessory muscles  CV:    regular rate and rhythm; no murmurs  Psych:  Mood, speech, affect and behavior are normal     ASSESSMENT:/PLAN:[KW1.1T]  Anxiety[KW1.1M]    Discussed this with the patient.  Recommended OTC symptomatic treatments.  Reassurance & Empathy given[KW1.1T]  lexapro and xanax refilled[KW1.1M]  Medication side effects were discussed, including the possibility of sedation.[KW1.1T]  He was only given a limited refill of Xanax is advised he would need to follow-up with his PCP for further refills.  Potential for addiction and or sedation was  [employer] rep in to talk to family discussed with the patient.[KW1.1M]  The patient indicates understanding of these issues and agrees with the plan.[KW1.1T]    Electronically Signed by:    Keith E. Weiler, MD , 3/19/2018[KW1.2T]          Revision History        User Key Date/Time User Provider Type Action    > KW1.2 03/19/18 08:29 PM Weiler, Keith E., MD Physician Sign     KW1.1 03/19/18 08:27 PM Weiler, Keith E., MD Physician     M - Manual, T - Template

## 2020-10-26 NOTE — OP NOTE
Dr. Abhay Granados MD      [address] [place]  10/26/20    Patient:  Angela Jackson  MRN: [id number]  YOB: 1940    Surgeon: Dr. Abhay Granados MD  Assistant: Narayan Garcia MD    Pre-op Diagnosis: OVERACTIVE BLADDER, INCONTINENCE  Post-op Diagnosis: Same    Procedure:   1. Sacral Neuromodulation, Stage 1 (Interstim)    Intraoperative Findings  1. 1 out of 4 leads had good dior (lead 2)    Anesthesia: Monitor Anesthesia Care  Complications: None  OR Blood Loss:  Minimal  Fluids: Cystalloids  Specimens:  * No specimens in log *    Indication:  78year old female with history of overactive bladder and urge incontinence and has failed medical therapy. She presents for the above procedure. Narrative of the Procedure: The patient was met in the preoperative area and the risks, benefits, and alternatives were discussed with the patient. They elected to proceed and thus informed consent was obtained. They were brought to the operating room and transferred to the operating table. They were placed in the prone position. Pillows were placed under lower abdomen to  flatten sacrum and under shins to allow the toes to dangle freely. A ground pad was placed on the bottom of the patients foot and the long test stimulation cable was connected to the ground pad and the external test stimulator. They were prepped and draped in the usual sterile fashion. A timeout was performed with all parties in agreement. The sciatic notches and sacral midline were identified using fluroscopy. Local injection was administered around the atticipated foramen needle entry point which was 2cm lateral to the sacral midline and 2cm cephalad of sciatic notch level. A foramen needle was introduced at an approximate 60 degree angle, feeling for the foraminal margins until S3 was identified.  Proper needle position was confirmed by stimulating the needle and observing a bellowing response, and also plantar flexion of the big toe. In addition, fluoroscopic images demonstrated appropriate placement. The foramen needle stylet was removed and a directional guide was placed through the needle. The foramen needle was removed by sliding it over the directional guide. A small incision was made next to the directional guide through the skin with a 15-blade knife. The lead introducer with dilator was placed over the directional guide. Utilizing fluoroscopic guidance the lead introducer was advanced until the radiopaque victor m was half-way through the foramen. The dilator was removed along with the directional guide. Using fluoroscopy, the tined lead with the straight stylet was placed through the introducer until electrodes two and three straddled the anterior edge of the sacrum. All four electrodes were tested, observing dior and plantar flexion of the great toe. 1 electrode demonstrated an excellent response. After satisfactory lead positioning was confirmed, the introducer was retracted over the lead under continuous fluoroscopy, deploying the tines into the presacral tissues. Re-testing after removal of the introducer re-confirmed the aforementioned responses. The potential internal neurostimulator pocket site was identified on the patient's left side below the iliac crest and lateral to the sacrum. Local was administered and an incision was made into the subcutaneous tissue creating a connection site. Blunt dissection was used to create a small pocket with hemostasis achieved. A tunneling tool with sheath was placed from the lead exit site subcutaneously to the small incised pocket site. The tunneling tool was removed and the lead was fed through the sheath, exiting at the pocket site. The sheath was removed. The lead was cleaned and dried.      Using the tunneling tool and sheath, a subcutaneous tunnel was created from the pocket site to the contralateral side The tunneler attachment was changed so as to grab the external lead and bring it back to the potential pocket site. The connection components were placed into the incised pocket after secured with the wrench. The incisions and pocket were irrigated and closed. The dermis was re-approximated with 2-0 Vicryl. The skin was closed with a 4-0 Monocryl in a subcuticular manner. The percutaneous extension was attached to the external gray twist-lock cable. The patient was awakened and transferred to the PACU in good and stable condition. In the PACU, using the external test stimulator, the patient was programmed to the electrode of optimum sensation and provided utilization instructions prior to discharge. Follow-Up: Patient will complete a voiding diary during the testing period. Should they have an appropriate response, the patient will present for a stage 2 procedure in the coming weeks. In addition, the patient will be discharged on 5 days of Keflex.        Antoinette Bunch  Electronically signed on 10/26/2020 at 1:27 PM

## 2020-11-05 ENCOUNTER — HOSPITAL ENCOUNTER (OUTPATIENT)
Dept: PREADMISSION TESTING | Age: 80
Setting detail: SPECIMEN
Discharge: HOME | End: 2020-11-09
Payer: MEDICARE

## 2020-11-05 LAB
SARS-COV-2, RAPID: NORMAL
SARS-COV-2: NORMAL
SARS-COV-2: NOT DETECTED
SOURCE: NORMAL

## 2020-11-05 PROCEDURE — U0003 INFECTIOUS AGENT DETECTION BY NUCLEIC ACID (DNA OR RNA); SEVERE ACUTE RESPIRATORY SYNDROME CORONAVIRUS 2 (SARS-COV-2) (CORONAVIRUS DISEASE [COVID-19]), AMPLIFIED PROBE TECHNIQUE, MAKING USE OF HIGH THROUGHPUT TECHNOLOGIES AS DESCRIBED BY CMS-2020-01-R: HCPCS

## 2020-11-09 ENCOUNTER — HOSPITAL ENCOUNTER (OUTPATIENT)
Age: 80
Setting detail: OUTPATIENT SURGERY
Discharge: HOME | End: 2020-11-09
Attending: UROLOGY | Admitting: UROLOGY
Payer: MEDICARE

## 2020-11-09 ENCOUNTER — ANESTHESIA (OUTPATIENT)
Dept: OPERATING ROOM | Age: 80
End: 2020-11-09
Payer: MEDICARE

## 2020-11-09 ENCOUNTER — ANESTHESIA EVENT (OUTPATIENT)
Dept: OPERATING ROOM | Age: 80
End: 2020-11-09
Payer: MEDICARE

## 2020-11-09 VITALS
TEMPERATURE: 97.2 F | DIASTOLIC BLOOD PRESSURE: 72 MMHG | BODY MASS INDEX: 35.36 KG/M2 | WEIGHT: 220 LBS | OXYGEN SATURATION: 99 % | SYSTOLIC BLOOD PRESSURE: 150 MMHG | RESPIRATION RATE: 19 BRPM | HEART RATE: 65 BPM | HEIGHT: 66 IN

## 2020-11-09 VITALS — OXYGEN SATURATION: 94 % | SYSTOLIC BLOOD PRESSURE: 113 MMHG | DIASTOLIC BLOOD PRESSURE: 75 MMHG

## 2020-11-09 PROCEDURE — C1767 GENERATOR, NEURO NON-RECHARG: HCPCS | Performed by: UROLOGY

## 2020-11-09 PROCEDURE — 3700000000 HC ANESTHESIA ATTENDED CARE: Performed by: UROLOGY

## 2020-11-09 PROCEDURE — 6360000002 HC RX W HCPCS: Performed by: STUDENT IN AN ORGANIZED HEALTH CARE EDUCATION/TRAINING PROGRAM

## 2020-11-09 PROCEDURE — 7100000041 HC SPAR PHASE II RECOVERY - ADDTL 15 MIN: Performed by: UROLOGY

## 2020-11-09 PROCEDURE — 3600000012 HC SURGERY LEVEL 2 ADDTL 15MIN: Performed by: UROLOGY

## 2020-11-09 PROCEDURE — 7100000040 HC SPAR PHASE II RECOVERY - FIRST 15 MIN: Performed by: UROLOGY

## 2020-11-09 PROCEDURE — 2709999900 HC NON-CHARGEABLE SUPPLY: Performed by: UROLOGY

## 2020-11-09 PROCEDURE — 2500000003 HC RX 250 WO HCPCS: Performed by: NURSE ANESTHETIST, CERTIFIED REGISTERED

## 2020-11-09 PROCEDURE — 6360000002 HC RX W HCPCS: Performed by: NURSE ANESTHETIST, CERTIFIED REGISTERED

## 2020-11-09 PROCEDURE — 2720000010 HC SURG SUPPLY STERILE: Performed by: UROLOGY

## 2020-11-09 PROCEDURE — 2580000003 HC RX 258: Performed by: UROLOGY

## 2020-11-09 PROCEDURE — 2500000003 HC RX 250 WO HCPCS: Performed by: UROLOGY

## 2020-11-09 PROCEDURE — 3700000001 HC ADD 15 MINUTES (ANESTHESIA): Performed by: UROLOGY

## 2020-11-09 PROCEDURE — 2580000003 HC RX 258: Performed by: ANESTHESIOLOGY

## 2020-11-09 PROCEDURE — 3600000002 HC SURGERY LEVEL 2 BASE: Performed by: UROLOGY

## 2020-11-09 DEVICE — KIT HDSET COMM SMRT PRGMR GU PROX US INTERSTIM: Type: IMPLANTABLE DEVICE | Status: FUNCTIONAL

## 2020-11-09 DEVICE — Z DUP USE 2628873 GENERATOR NEUROSTIMULATOR H1.7XL2IN THK3IN TORQ WRNCH PROD: Type: IMPLANTABLE DEVICE | Status: FUNCTIONAL

## 2020-11-09 RX ORDER — DIPHENHYDRAMINE HYDROCHLORIDE 50 MG/ML
12.5 INJECTION INTRAMUSCULAR; INTRAVENOUS
Status: DISCONTINUED | OUTPATIENT
Start: 2020-11-09 | End: 2020-11-09 | Stop reason: HOSPADM

## 2020-11-09 RX ORDER — TRAMADOL HYDROCHLORIDE 50 MG/1
50 TABLET ORAL EVERY 6 HOURS PRN
Qty: 18 TABLET | Refills: 0 | Status: SHIPPED | OUTPATIENT
Start: 2020-11-09 | End: 2020-11-14

## 2020-11-09 RX ORDER — LABETALOL HYDROCHLORIDE 5 MG/ML
5 INJECTION, SOLUTION INTRAVENOUS EVERY 10 MIN PRN
Status: DISCONTINUED | OUTPATIENT
Start: 2020-11-09 | End: 2020-11-09 | Stop reason: HOSPADM

## 2020-11-09 RX ORDER — GLYCOPYRROLATE 1 MG/5 ML
SYRINGE (ML) INTRAVENOUS PRN
Status: DISCONTINUED | OUTPATIENT
Start: 2020-11-09 | End: 2020-11-09 | Stop reason: SDUPTHER

## 2020-11-09 RX ORDER — BUPIVACAINE HYDROCHLORIDE AND EPINEPHRINE 2.5; 5 MG/ML; UG/ML
INJECTION, SOLUTION EPIDURAL; INFILTRATION; INTRACAUDAL; PERINEURAL PRN
Status: DISCONTINUED | OUTPATIENT
Start: 2020-11-09 | End: 2020-11-09 | Stop reason: ALTCHOICE

## 2020-11-09 RX ORDER — ONDANSETRON 2 MG/ML
4 INJECTION INTRAMUSCULAR; INTRAVENOUS
Status: DISCONTINUED | OUTPATIENT
Start: 2020-11-09 | End: 2020-11-09 | Stop reason: HOSPADM

## 2020-11-09 RX ORDER — FENTANYL CITRATE 50 UG/ML
25 INJECTION, SOLUTION INTRAMUSCULAR; INTRAVENOUS EVERY 5 MIN PRN
Status: DISCONTINUED | OUTPATIENT
Start: 2020-11-09 | End: 2020-11-09 | Stop reason: HOSPADM

## 2020-11-09 RX ORDER — CEPHALEXIN 500 MG/1
500 CAPSULE ORAL 2 TIMES DAILY
Qty: 14 CAPSULE | Refills: 0 | Status: SHIPPED | OUTPATIENT
Start: 2020-11-09 | End: 2020-11-16

## 2020-11-09 RX ORDER — SODIUM CHLORIDE, SODIUM LACTATE, POTASSIUM CHLORIDE, CALCIUM CHLORIDE 600; 310; 30; 20 MG/100ML; MG/100ML; MG/100ML; MG/100ML
1000 INJECTION, SOLUTION INTRAVENOUS CONTINUOUS
Status: DISCONTINUED | OUTPATIENT
Start: 2020-11-09 | End: 2020-11-09 | Stop reason: HOSPADM

## 2020-11-09 RX ORDER — MAGNESIUM HYDROXIDE 1200 MG/15ML
LIQUID ORAL CONTINUOUS PRN
Status: COMPLETED | OUTPATIENT
Start: 2020-11-09 | End: 2020-11-09

## 2020-11-09 RX ORDER — LIDOCAINE HYDROCHLORIDE 10 MG/ML
INJECTION, SOLUTION EPIDURAL; INFILTRATION; INTRACAUDAL; PERINEURAL PRN
Status: DISCONTINUED | OUTPATIENT
Start: 2020-11-09 | End: 2020-11-09 | Stop reason: SDUPTHER

## 2020-11-09 RX ORDER — PROPOFOL 10 MG/ML
INJECTION, EMULSION INTRAVENOUS CONTINUOUS PRN
Status: DISCONTINUED | OUTPATIENT
Start: 2020-11-09 | End: 2020-11-09 | Stop reason: SDUPTHER

## 2020-11-09 RX ORDER — FENTANYL CITRATE 50 UG/ML
INJECTION, SOLUTION INTRAMUSCULAR; INTRAVENOUS PRN
Status: DISCONTINUED | OUTPATIENT
Start: 2020-11-09 | End: 2020-11-09 | Stop reason: SDUPTHER

## 2020-11-09 RX ADMIN — FENTANYL CITRATE 25 MCG: 50 INJECTION INTRAMUSCULAR; INTRAVENOUS at 09:34

## 2020-11-09 RX ADMIN — CEFAZOLIN 2 G: 10 INJECTION, POWDER, FOR SOLUTION INTRAVENOUS at 09:09

## 2020-11-09 RX ADMIN — SODIUM CHLORIDE, POTASSIUM CHLORIDE, SODIUM LACTATE AND CALCIUM CHLORIDE 1000 ML: 600; 310; 30; 20 INJECTION, SOLUTION INTRAVENOUS at 07:34

## 2020-11-09 RX ADMIN — LIDOCAINE HYDROCHLORIDE 50 MG: 10 INJECTION, SOLUTION EPIDURAL; INFILTRATION; INTRACAUDAL; PERINEURAL at 09:02

## 2020-11-09 RX ADMIN — PROPOFOL 150 MCG/KG/MIN: 10 INJECTION, EMULSION INTRAVENOUS at 09:02

## 2020-11-09 RX ADMIN — FENTANYL CITRATE 25 MCG: 50 INJECTION INTRAMUSCULAR; INTRAVENOUS at 09:11

## 2020-11-09 RX ADMIN — Medication 0.2 MG: at 09:16

## 2020-11-09 RX ADMIN — FENTANYL CITRATE 50 MCG: 50 INJECTION INTRAMUSCULAR; INTRAVENOUS at 09:02

## 2020-11-09 ASSESSMENT — PULMONARY FUNCTION TESTS
PIF_VALUE: 1
PIF_VALUE: 0
PIF_VALUE: 0
PIF_VALUE: 1
PIF_VALUE: 0
PIF_VALUE: 0
PIF_VALUE: 1
PIF_VALUE: 2
PIF_VALUE: 1
PIF_VALUE: 0
PIF_VALUE: 1
PIF_VALUE: 0
PIF_VALUE: 1
PIF_VALUE: 0
PIF_VALUE: 1

## 2020-11-09 ASSESSMENT — PAIN - FUNCTIONAL ASSESSMENT: PAIN_FUNCTIONAL_ASSESSMENT: 0-10

## 2020-11-09 ASSESSMENT — PAIN SCALES - GENERAL
PAINLEVEL_OUTOF10: 0

## 2020-11-09 ASSESSMENT — LIFESTYLE VARIABLES: SMOKING_STATUS: 0

## 2020-11-09 NOTE — OP NOTE
Operative Note      FACILITY:  [address] [place] [address]  1940  [id number]    DATE: 11/9/20  SURGEON: YUNIER Cortes Oz: Dr. Margaret Calderon MD  PREOPERATIVE DIAGNOSIS: Overactive bladder and urge incontinence   POSTOPERATIVE DIAGNOSIS: same  OPERATION:  1. Interstim Stage 2  ANESTHESIA: MAC   COMPLICATIONS: None.   ESTIMATED BLOOD LOSS: Minimal.  FLUIDS: Crystalloid. DRAINS: none. SPECIMENS: None.      INDICATIONS FOR THE PROCEDURE:  Donna Bonner is a 78 y.o. female  with a history of overactive bladder and urge incontience, which improved after interstim stage 1. After treatment options were discussed including risks, benefits, alternatives, goals and possible complications, the patient elected to proceed with today's procedure.      NARRATIVE SUMMARY OF THE PROCEDURE:  The patient was brought to the OR. The patient was placed in prone position. A pillow was placed underneath her pelvis area to slightly lift the pelvis up. The patient was awake, was given some MAC anesthesia. The patient's back was prepped and draped in the usual sterile fashion. We identified the previous incision over the right buttocks, with the lead and boot in place. We anesthetized with 1% lidocaine, and then made an incision with 15 blade scalpel through the epidermis and dissected down to the subcutaneous pocket. We identified the lead and the boot, which were delivered. We cut the external lead, and removed intact. We removed the boot, and unscrewed the internal lead. The lead was dried, and then placed into the neuromodulation battery. After securing, the subcutaneous pocket was created. We irrigated with saline solution and placed the neuromodulator in good position. The scarpas layer was closed with 3-0 vicryl, and the skin with 4-0 monocryl and dermabond. The [employer] rep check the impedence and it was confirmed low in all the leads.   The patient was brought to recovery in a stable condition.      Plan:  Discharge home in stable condition, she will need to follow up in 6-8 weeks     Barry Schaeffer MD

## 2020-11-09 NOTE — H&P
History and Physical    Patient:  Onelia Reyes  MRN: [id number]  YOB: 1940    CHIEF COMPLAINT:      HISTORY OF PRESENT ILLNESS:   The patient is a 78 y.o. female who presents with overative bladder and urge incontinence. She had an interstim 1 procedure on 10/26/20 and now presents for the stage 2 procedure. Patient's old records, notes and chart reviewed and summarized above.      Past Medical History:    Past Medical History:   Diagnosis Date    Anemia     pernicious anemia    Anticardiolipin antibody positive     Antiphospholipid antibody positive     Anxiety     Colon polyps     Depression     Dermatitis     Eczema     Glaucoma     Hypergranulation     PG leg ulcer    Hypothyroidism     Macular degeneration     Memory difficulty     was not able to relay any PMH day of PAT appt, we spoke to her daughter/POA Ila Kimball on the phone,she will be with her day of surgery    OAB (overactive bladder)     Osteoarthritis     Poor historian     Primary hypercoagulable state ([address]     Pyoderma gangrenosa     ankle    Urinary incontinence     Wears dentures     Wears glasses     Wellness examination     pcp-Dr Macedo-last visit sept 2020    Wellness examination     dermatologist-Dr Mishra       Past Surgical History:    Past Surgical History:   Procedure Laterality Date    COLONOSCOPY  2020    x2    CYSTOGRAPHY N/A 8/18/2020    URODYNAMICS performed by Janis Donohue MD at [address]  8/18/2020    CYSTOSCOPY performed by Janis Donohue MD at [address]  10/26/2020    sacral nerve stimulator phase 1    NERVE SURGERY N/A 10/26/2020    SACRAL NERVE STIMULATOR IMPLANT STAGE 1 WITH INTRAOPERATIVE PROGRAMMING performed by Janis Donohue MD at [address] [place] [address]    UMBILICAL HERNIA REPAIR       Medications Prior to Admission:    Prior to Admission medications    Medication Sig Start Date End Date Taking? Authorizing Provider   meloxicam (MOBIC) 15 MG tablet Take 15 mg by mouth nightly 7/22/20   Historical Provider, MD   trospium (SANCTURA) 60 MG CP24 extended release capsule Take 1 capsule by mouth daily 8/18/20   Lorna Kennedy MD   folic acid (FOLVITE) 1 MG tablet Take 1 mg by mouth 2 times daily. Historical Provider, MD   FLUoxetine (PROZAC) 20 MG capsule Take 20 mg by mouth 2 times daily. Historical Provider, MD   levothyroxine (SYNTHROID) 100 MCG tablet Take 150 mcg by mouth Daily     Historical Provider, MD   simvastatin (ZOCOR) 10 MG tablet Take 10 mg by mouth nightly. Historical Provider, MD   iron polysaccaride (FERREX 150 FORTE PLUS)  MG CAPS Take 1 capsule by mouth daily. Historical Provider, MD   cyanocobalamin 1000 MCG/ML injection Inject 1,000 mcg into the muscle every 30 days. Historical Provider, MD   hydroxychloroquine (PLAQUENIL) 200 MG tablet Take 200 mg by mouth 2 times daily. Historical Provider, MD   cilostazol (PLETAL) 100 MG tablet Take 100 mg by mouth 2 times daily. Historical Provider, MD   DHA-EPA-Flaxseed Oil-Vitamin E (THERA TEARS) CAPS Take  by mouth. Historical Provider, MD   NONFORMULARY betamol drops, 1 drop in each eye twice a day    Historical Provider, MD   aspirin 81 MG tablet Take 81 mg by mouth daily. Historical Provider, MD       Allergies:  Latex;  Codeine; and Vicodin [hydrocodone-acetaminophen]    Social History:    Social History     Socioeconomic History    Marital status: Single     Spouse name: Not on file    Number of children: Not on file    Years of education: Not on file    Highest education level: Not on file   Occupational History    Not on file   Social Needs    Financial resource strain: Not on file    Food insecurity     Worry: Not on file     Inability: Not on file    Transportation needs     Medical: Not on file     Non-medical: Not on file   Tobacco Use    Smoking status: Never Smoker    Smokeless tobacco: Never Used   Substance and Sexual Activity    Alcohol use: No    Drug use: No    Sexual activity: Not on file   Lifestyle    Physical activity     Days per week: Not on file     Minutes per session: Not on file    Stress: Not on file   Relationships    Social connections     Talks on phone: Not on file     Gets together: Not on file     Attends Yazidism service: Not on file     Active member of club or organization: Not on file     Attends meetings of clubs or organizations: Not on file     Relationship status: Not on file    Intimate partner violence     Fear of current or ex partner: Not on file     Emotionally abused: Not on file     Physically abused: Not on file     Forced sexual activity: Not on file   Other Topics Concern    Not on file   Social History Narrative    Not on file       Family History:    Family History   Problem Relation Age of Onset    No Known Problems Mother     No Known Problems Father        REVIEW OF SYSTEMS:  Constitutional: negative  Eyes: negative  Respiratory: negative  Cardiovascular: negative  Gastrointestinal: negative  Genitourinary: see HPI  Musculoskeletal: negative  Skin: negative   Neurological: negative  Hematological/Lymphatic: negative  Psychological: negative    Physical Exam:      No data found. Constitutional: Patient in no acute distress; Neuro: alert and oriented to person place and time. Psych: Mood and affect normal.  Skin: Normal  Lungs: Respiratory effort normal, CTA  Cardiovascular:  Normal peripheral pulses  Abdomen: Soft, non-tender, non-distended  Bladder non-tender and not distended. LABS:   No results for input(s): WBC, HGB, HCT, MCV, PLT in the last 72 hours. No results for input(s): NA, K, CL, CO2, PHOS, BUN, CREATININE in the last 72 hours.     Invalid input(s): CA  No results found for: PSA        Urinalysis: No results for input(s): COLORU, PHUR, LABCAST, WBCUA, RBCUA, MUCUS, TRICHOMONAS, YEAST, BACTERIA, CLARITYU, Lidya Punches, UROBILINOGEN, Juarez Clore in the last 72 hours. Invalid input(s): NITRATE, GLUCOSEUKETONESUAMORPHOUS     -----------------------------------------------------------------      Assessment and Plan   Impression:  There is no problem list on file for this patient. Plan:     - OR today for interstim 2  - Consent obtained  - COVID negative    The patient was counseled at length about the risks of barry Covid-19 during their perioperative period and any recovery window from their procedure. The patient was made aware that barry Covid-19  may worsen their prognosis for recovering from their procedure  and lend to a higher morbidity and/or mortality risk. All material risks, benefits, and reasonable alternatives including postponing the procedure were discussed. The patient does wish to proceed with the procedure at this time.           Shireen Tate  7:08 AM 11/9/2020

## 2020-11-09 NOTE — ANESTHESIA PRE PROCEDURE
Department of Anesthesiology  Preprocedure Note       Name:  Dominga Frazier   Age:  78 y.o.  :  1940                                          MRN:  [id number]         Date:  2020      Surgeon: Sagar Curtis):  Yamile Knight MD    Procedure: Procedure(s):  SACRAL NERVE STIMULATOR IMPLANT STAGE 2 (REP NOTIFIED [address])    Department of Anesthesiology  Pre-Anesthesia Evaluation/Consultation         Name:  Dominga Frazier                                         Age:  78 y.o. MRN:  [id number]             Medications  Current Facility-Administered Medications   Medication Dose Route Frequency Provider Last Rate Last Dose    lactated ringers infusion 1,000 mL  1,000 mL Intravenous Continuous Teresita Diaz MD        ceFAZolin (ANCEF) 2 g in dextrose 5 % 50 mL IVPB  2 g Intravenous Once Myriam Ramos MD           Allergies   Allergen Reactions    Latex     Codeine     Vicodin [Hydrocodone-Acetaminophen]      There is no problem list on file for this patient.     Past Medical History:   Diagnosis Date    Anemia     pernicious anemia    Anticardiolipin antibody positive     Antiphospholipid antibody positive     Anxiety     Colon polyps     Depression     Dermatitis     Eczema     Glaucoma     Hypergranulation     PG leg ulcer    Hypothyroidism     Macular degeneration     Memory difficulty     was not able to relay any PMH day of PAT appt, we spoke to her daughter/ZEINAB Oliver on the phone,she will be with her day of surgery    OAB (overactive bladder)     Osteoarthritis     Poor historian     Primary hyperco[address]able state ([facility] [address]     Pyoderma gangrenosa     ankle    Urinary incontinence     Wears dentures     Wears glasses     Wellness examination     pcp-Dr Macedo-last visit 2020    Wellness examination     dermatologist-Dr Mishra     Past Surgical History:   Procedure Laterality Date    COLONOSCOPY  2020    x2    CYSTOGRAPHY N/A 2020    URODYNAMICS performed by Nicholas Hong MD at [address] [place]  2020    CYSTOSCOPY performed by Nicholas Hong MD at [address]  10/26/2020    sacral nerve stimulator phase 1    NERVE SURGERY N/A 10/26/2020    SACRAL NERVE STIMULATOR IMPLANT STAGE 1 WITH INTRAOPERATIVE PROGRAMMING performed by Nicholas Hong MD at [address]    UMBILICAL HERNIA REPAIR       Social History     Tobacco Use    Smoking status: Never Smoker    Smokeless tobacco: Never Used   Substance Use Topics    Alcohol use: No    Drug use: No         Vital Signs (Current)   Vitals:    20   BP: (!) 171/76   Pulse: 92   Resp: 18   Temp: 96.8 °F (36 °C)   SpO2: 99%     Vital Signs Statistics (for past 48 hrs)     Temp  Av.8 °F (36 °C)  Min: 96.8 °F (36 °C)   Min taken time: 20  Max: 96.8 °F (36 °C)   Max taken time: 20  Pulse  Av  Min: 92   Min taken time: 20  Max: 92   Max taken time: 20  Resp  Av  Min: 18   Min taken time: 20  Max: 25   Max taken time: 20  BP  Min: 171/76   Min taken time: 20  Max: 171/76   Max taken time: 20  SpO2  Av %  Min: 99 %   Min taken time: 20  Max: 99 %   Max taken time: 20  BP Readings from Last 3 Encounters:   20 (!) 171/76   10/26/20 (!) 147/83   10/26/20 (!) 130/114       BMI  Body mass index is 35.51 kg/m².     CBC   Lab Results   Component Value Date    WBC 4.3 10/02/2020    RBC 3.64 10/02/2020    HGB 11.5 10/02/2020    HCT 37.8 10/02/2020    .8 10/02/2020    RDW 13.5 10/02/2020     10/02/2020       CMP    Lab Results   Component Value Date     10/02/2020    K 4.6 10/02/2020     10/02/2020    CO2 24 10/02/2020    BUN 35 10/02/2020    CREATININE 1.43 10/02/2020    GFRAA 43 10/02/2020    LABGLOM 35 10/02/2020    GLUCOSE 79 10/02/2020    PROT 6.9 05/15/2017 CALCIUM 9.6 05/15/2017    BILITOT 0.30 05/15/2017    ALKPHOS 66 05/15/2017    AST 25 05/15/2017    ALT 21 05/15/2017       BMP    Lab Results   Component Value Date     10/02/2020    K 4.6 10/02/2020     10/02/2020    CO2 24 10/02/2020    BUN 35 10/02/2020    CREATININE 1.43 10/02/2020    CALCIUM 9.6 05/15/2017    GFRAA 43 10/02/2020    LABGLOM 35 10/02/2020    GLUCOSE 79 10/02/2020       POC Testing  No results for input(s): POCGLU, POCNA, POCK, POCCL, POCBUN, POCHEMO, POCHCT in the last 72 hours. Coags  No results found for: PROTIME, INR, APTT    HCG (If Applicable) No results found for: PREGTESTUR, PREGSERUM, HCG, HCGQUANT     ABGs No results found for: PHART, PO2ART, HME1ZHP, BUZ2YEJ, BEART, Q0YAVANW     Type & Screen (If Applicable)  No results found for: LABABO, [address]    Radiology (If Applicable)    Cardiac Testing (If Applicable)     EKG (If Applicable) PAC's          Medications prior to admission:   Prior to Admission medications    Medication Sig Start Date End Date Taking? Authorizing Provider   meloxicam (MOBIC) 15 MG tablet Take 15 mg by mouth nightly 7/22/20   Historical Provider, MD   trospium (SANCTURA) 60 MG CP24 extended release capsule Take 1 capsule by mouth daily 8/18/20   Gisselle Rodriguez MD   folic acid (FOLVITE) 1 MG tablet Take 1 mg by mouth 2 times daily. Historical Provider, MD   FLUoxetine (PROZAC) 20 MG capsule Take 20 mg by mouth 2 times daily. Historical Provider, MD   levothyroxine (SYNTHROID) 100 MCG tablet Take 150 mcg by mouth Daily     Historical Provider, MD   simvastatin (ZOCOR) 10 MG tablet Take 10 mg by mouth nightly. Historical Provider, MD   iron polysaccaride (FERREX 150 FORTE PLUS)  MG CAPS Take 1 capsule by mouth daily. Historical Provider, MD   cyanocobalamin 1000 MCG/ML injection Inject 1,000 mcg into the muscle every 30 days. Historical Provider, MD   hydroxychloroquine (PLAQUENIL) 200 MG tablet Take 200 mg by mouth 2 times daily. Historical Provider, MD   cilostazol (PLETAL) 100 MG tablet Take 100 mg by mouth 2 times daily. Historical Provider, MD   NONFORMULARY betamol drops, 1 drop in each eye twice a day    Historical Provider, MD   aspirin 81 MG tablet Take 81 mg by mouth daily. Historical Provider, MD       Current medications:    No current facility-administered medications for this visit. No current outpatient medications on file. Facility-Administered Medications Ordered in Other Visits   Medication Dose Route Frequency Provider Last Rate Last Dose    lactated ringers infusion 1,000 mL  1,000 mL Intravenous Continuous Teresita Diaz MD        ceFAZolin (ANCEF) 2 g in dextrose 5 % 50 mL IVPB  2 g Intravenous Once Esteban Sandra MD           Allergies: Allergies   Allergen Reactions    Latex     Codeine     Vicodin [Hydrocodone-Acetaminophen]        Problem List:  There is no problem list on file for this patient.       Past Medical History:        Diagnosis Date    Anemia     pernicious anemia    Anticardiolipin antibody positive     Antiphospholipid antibody positive     Anxiety     Colon polyps     Depression     Dermatitis     Eczema     Glaucoma     Hypergranulation     PG leg ulcer    Hypothyroidism     Macular degeneration     Memory difficulty     was not able to relay any PMH day of PAT appt, we spoke to her daughter/ZEINAB Farah on the phone,she will be with her day of surgery    OAB (overactive bladder)     Osteoarthritis     Poor historian     Primary hyperco[address]able state ([facility] [address]     Pyoderma gangrenosa     ankle    Urinary incontinence     Wears dentures     Wears glasses     Wellness examination     pcp-Dr Macedo-last visit sept 2020    Wellness examination     dermatologist-Dr Myrtle Castillo       Past Surgical History:        Procedure Laterality Date    COLONOSCOPY  2020    x2    CYSTOGRAPHY N/A 8/18/2020    URODYNAMICS performed by Jeremi Lehman MD at [address] [place] [address] CYSTOSCOPY  8/18/2020    CYSTOSCOPY performed by Isael Hodge MD at [address] [facility] SURGERY  10/26/2020    sacral nerve stimulator phase 1    NERVE SURGERY N/A 10/26/2020    SACRAL NERVE STIMULATOR IMPLANT STAGE 1 WITH INTRAOPERATIVE PROGRAMMING performed by Isael Hodge MD at [address] [facility] [address]    UMBILICAL HERNIA REPAIR         Social History:    Social History     Tobacco Use    Smoking status: Never Smoker    Smokeless tobacco: Never Used   Substance Use Topics    Alcohol use: No                                Counseling given: Not Answered      Vital Signs (Current): There were no vitals filed for this visit. BP Readings from Last 3 Encounters:   11/09/20 (!) 171/76   10/26/20 (!) 147/83   10/26/20 (!) 130/114       NPO Status:  MN                                                                               BMI:   Wt Readings from Last 3 Encounters:   11/09/20 220 lb (99.8 kg)   10/26/20 213 lb (96.6 kg)   10/06/20 213 lb 8 oz (96.8 kg)     There is no height or weight on file to calculate BMI.    CBC:   Lab Results   Component Value Date    WBC 4.3 10/02/2020    RBC 3.64 10/02/2020    HGB 11.5 10/02/2020    HCT 37.8 10/02/2020    .8 10/02/2020    RDW 13.5 10/02/2020     10/02/2020       CMP:   Lab Results   Component Value Date     10/02/2020    K 4.6 10/02/2020     10/02/2020    CO2 24 10/02/2020    BUN 35 10/02/2020    CREATININE 1.43 10/02/2020    GFRAA 43 10/02/2020    LABGLOM 35 10/02/2020    GLUCOSE 79 10/02/2020    PROT 6.9 05/15/2017    CALCIUM 9.6 05/15/2017    BILITOT 0.30 05/15/2017    ALKPHOS 66 05/15/2017    AST 25 05/15/2017    ALT 21 05/15/2017       POC Tests: No results for input(s): POCGLU, POCNA, POCK, POCCL, POCBUN, POCHEMO, POCHCT in the last 72 hours.     Coags: No results found for: PROTIME, INR, APTT    HCG (If Applicable): No results found for: PREGTESTUR, PREGSERUM, HCG, HCGQUANT     ABGs: No results found for: PHART, PO2ART, QII6XGY, IES6TGS, BEART, J7OUCYPU     Type & Screen (If Applicable):  No results found for: LABABO, LABRH    Drug/Infectious Status (If Applicable):  No results found for: HIV, HEPCAB    COVID-19 Screening (If Applicable):   Lab Results   Component Value Date    COVID19 Not Detected 11/05/2020    COVID19 Not Detected 10/22/2020         Anesthesia Evaluation  Patient summary reviewed and Nursing notes reviewed no history of anesthetic complications:   Airway: Mallampati: II  TM distance: >3 FB   Neck ROM: full  Mouth opening: > = 3 FB Dental: normal exam         Pulmonary:Negative Pulmonary ROS breath sounds clear to auscultation      (-) recent URI and not a current smoker                           Cardiovascular:  Exercise tolerance: good (>4 METS),       (-) valvular problems/murmurs, past MI and CAD      Rhythm: regular  Rate: normal      Cleared by cardiology     Beta Blocker:  Not on Beta Blocker         Neuro/Psych:   Negative Neuro/Psych ROS  (+) psychiatric history: stable with treatmentdepression/anxiety    (-) seizures and CVA            ROS comment: glaucoma GI/Hepatic/Renal: Neg GI/Hepatic/Renal ROS  (+) renal disease:,      (-) GERD       Endo/Other: Negative Endo/Other ROS   (+) hypothyroidism, blood dyscrasia: anemia:., .    (-) diabetes mellitus               Abdominal:       Abdomen: soft. Vascular: negative vascular ROS. Anesthesia Plan      MAC     ASA 2     (Asa 2)  Induction: intravenous. Anesthetic plan and risks discussed with patient. Plan discussed with CRNA.     Attending anesthesiologist reviewed and agrees with Pre Eval content            Nawaf Rowe MD   11/9/2020

## 2020-11-09 NOTE — ANESTHESIA POSTPROCEDURE EVALUATION
Department of Anesthesiology  Postprocedure Note    Patient: Donna Bonner  MRN: [id number]  YOB: 1940  Date of evaluation: 11/9/2020  Time:  11:08 AM     Procedure Summary     Date:  11/09/20 Room / Location:  [facility] / [facility]    Anesthesia Start:  3206 Anesthesia Stop:  1001    Procedure:  SACRAL NERVE STIMULATOR IMPLANT STAGE 2 (REP NOTIFIED BY CLINIC) (N/A ) Diagnosis:  (OVERACTIVE BLADDER, INCONTINENCE)    Surgeon:  Alex Rider MD Responsible Provider:  Jami Miller MD    Anesthesia Type:  MAC ASA Status:  2          Anesthesia Type: MAC    Murray Phase I:      Murray Phase II: Murray Score: 10    Last vitals: Reviewed and per EMR flowsheets.        Anesthesia Post Evaluation    Patient location during evaluation: PACU  Patient participation: complete - patient participated  Level of consciousness: awake and alert  Pain score: 0  Nausea & Vomiting: no nausea  Cardiovascular status: hemodynamically stable  Respiratory status: room air  Hydration status: euvolemic

## 2021-01-22 ENCOUNTER — OFFICE VISIT (OUTPATIENT)
Dept: UROLOGY | Age: 81
End: 2021-01-22
Payer: MEDICARE

## 2021-01-22 VITALS
SYSTOLIC BLOOD PRESSURE: 134 MMHG | DIASTOLIC BLOOD PRESSURE: 74 MMHG | WEIGHT: 224.6 LBS | BODY MASS INDEX: 36.1 KG/M2 | HEIGHT: 66 IN | HEART RATE: 68 BPM

## 2021-01-22 DIAGNOSIS — N39.46 MIXED STRESS AND URGE URINARY INCONTINENCE: ICD-10-CM

## 2021-01-22 PROCEDURE — 99024 POSTOP FOLLOW-UP VISIT: CPT | Performed by: UROLOGY

## 2021-01-22 PROCEDURE — 99212 OFFICE O/P EST SF 10 MIN: CPT | Performed by: UROLOGY

## 2021-01-22 NOTE — PROGRESS NOTES
Postop stage II InterStim November 9, 2020. She reports incontinence and frequency and she is unhappy. She does state that after her stage I she was pleased with her results. She is unsure how to compare her symptoms pre and postop. We will have patient be arranged with Anil Mccray or Jen Cam with [employer] for device interrogation and setting adjustment. Follow-up after that to review symptoms.

## 2021-03-19 ENCOUNTER — OFFICE VISIT (OUTPATIENT)
Dept: UROLOGY | Age: 81
End: 2021-03-19
Payer: MEDICARE

## 2021-03-19 VITALS
WEIGHT: 223 LBS | SYSTOLIC BLOOD PRESSURE: 128 MMHG | DIASTOLIC BLOOD PRESSURE: 70 MMHG | HEIGHT: 66 IN | HEART RATE: 77 BPM | TEMPERATURE: 97.3 F | BODY MASS INDEX: 35.84 KG/M2

## 2021-03-19 DIAGNOSIS — N39.46 MIXED STRESS AND URGE URINARY INCONTINENCE: Primary | ICD-10-CM

## 2021-03-19 LAB
BILIRUBIN, POC: ABNORMAL
BLOOD URINE, POC: NEGATIVE
CLARITY, POC: ABNORMAL
COLOR, POC: ABNORMAL
GLUCOSE URINE, POC: NEGATIVE
KETONES, POC: ABNORMAL
LEUKOCYTE EST, POC: NEGATIVE
NITRITE, POC: NEGATIVE
PH, POC: 5.5
PROTEIN, POC: 30
SPECIFIC GRAVITY, POC: >1.03
UROBILINOGEN, POC: 0.2

## 2021-03-19 PROCEDURE — G8399 PT W/DXA RESULTS DOCUMENT: HCPCS | Performed by: UROLOGY

## 2021-03-19 PROCEDURE — G8484 FLU IMMUNIZE NO ADMIN: HCPCS | Performed by: UROLOGY

## 2021-03-19 PROCEDURE — G8417 CALC BMI ABV UP PARAM F/U: HCPCS | Performed by: UROLOGY

## 2021-03-19 PROCEDURE — 4040F PNEUMOC VAC/ADMIN/RCVD: CPT | Performed by: UROLOGY

## 2021-03-19 PROCEDURE — 81003 URINALYSIS AUTO W/O SCOPE: CPT | Performed by: UROLOGY

## 2021-03-19 PROCEDURE — 1090F PRES/ABSN URINE INCON ASSESS: CPT | Performed by: UROLOGY

## 2021-03-19 PROCEDURE — 1036F TOBACCO NON-USER: CPT | Performed by: UROLOGY

## 2021-03-19 PROCEDURE — 99214 OFFICE O/P EST MOD 30 MIN: CPT | Performed by: UROLOGY

## 2021-03-19 PROCEDURE — G8427 DOCREV CUR MEDS BY ELIG CLIN: HCPCS | Performed by: UROLOGY

## 2021-03-19 PROCEDURE — 0509F URINE INCON PLAN DOCD: CPT | Performed by: UROLOGY

## 2021-03-19 PROCEDURE — 1123F ACP DISCUSS/DSCN MKR DOCD: CPT | Performed by: UROLOGY

## 2021-03-19 NOTE — PROGRESS NOTES
[id number]  [address] [facility] [address] [address]  Dept: [phone]  Dept Fax: [phone]      [facility] Office Note  Follow Up Visit    Patient:  Eleanor Arriaga  YOB: 1940  Date: 3/19/2021    The patient is a [de-identified] y.o. female who presents today for evaluation of the following problems:   Chief Complaint   Patient presents with    3 Month Follow-Up    Other     INTERSTIM     Post-Op Check        HISTORY OF PRESENT ILLNESS:     oab  Had improvement after stage I but not stage II  Needs reprogramming with rep          Requested/reviewed records from Carroll Krause MD office and/or outside [de-identified]    (Patient's old records have been requested, reviewed and pertinent findings summarized in today's note.)    Last several PSA's:  No results found for: PSA    Last total testosterone:  No results found for: TESTOSTERONE    Urinalysis today:  Results for POC orders placed in visit on 03/19/21   POCT Urinalysis No Micro (Auto)   Result Value Ref Range    Color, UA DARK YELLOW     Clarity, UA SLIGHTLY CLOUDY     Glucose, UA POC NEGATIVE     Bilirubin, UA SMALL     Ketones, UA TRACE     Spec Grav, UA >1.030     Blood, UA POC NEGATIVE     pH, UA 5.5     Protein, UA POC 30     Urobilinogen, UA 0.2     Leukocytes, UA NEGATIVE     Nitrite, UA NEGATIVE        Last BUN and creatinine:  Lab Results   Component Value Date    BUN 35 (H) 10/02/2020     Lab Results   Component Value Date    CREATININE 1.43 (H) 10/02/2020       Additional Lab/Culture results: none    Imaging Reviewed during this Office Visit:   Joellen Mann MD independently reviewed the images and verified the radiology reports from:    No results found.     PAST MEDICAL, FAMILY AND SOCIAL HISTORY:  Past Medical History:   Diagnosis Date    Anemia     pernicious anemia    Anticardiolipin antibody positive     Antiphospholipid antibody positive     Anxiety     Colon polyps     Depression     Dermatitis     Eczema     Glaucoma     Hypergranulation     PG leg ulcer    Hypothyroidism     Macular degeneration     Memory difficulty     was not able to relay any PMH day of PAT appt, we spoke to her daughter/ZEINAB Dickson on the phone,she will be with her day of surgery    OAB (overactive bladder)     Osteoarthritis     Poor historian     Primary hypercoagulable state ([address]     Pyoderma gangrenosa     ankle    Urinary incontinence     Wears dentures     Wears glasses     Wellness examination     pcp-Dr Macedo-last visit sept 2020    Wellness examination     dermatologist-Dr Mishra     Past Surgical History:   Procedure Laterality Date    COLONOSCOPY  2020    x2    CYSTOGRAPHY N/A 8/18/2020    URODYNAMICS performed by Courtney Sanchez MD at [address]  8/18/2020    CYSTOSCOPY performed by Courtney Sanchez MD at [address]  10/26/2020    sacral nerve stimulator phase 1    NERVE SURGERY N/A 10/26/2020    SACRAL NERVE STIMULATOR IMPLANT STAGE 1 WITH INTRAOPERATIVE PROGRAMMING performed by Courtney Sanchez MD at [address] N/A 11/9/2020    [facility] 2 (REP NOTIFIED BY CLINIC) performed by Courtney Sanchez MD at [address]  11/09/2020    stage 2    TOTAL KNEE ARTHROPLASTY Right 1499    UMBILICAL HERNIA REPAIR       Family History   Problem Relation Age of Onset    No Known Problems Mother     No Known Problems Father      Outpatient Medications Marked as Taking for the 3/19/21 encounter (Office Visit) with Esthela Rutherford MD   Medication Sig Dispense Refill    meloxicam (MOBIC) 15 MG tablet Take 15 mg by mouth nightly      trospium (SANCTURA) 60 MG CP24 extended release capsule Take 1 capsule by mouth daily 30 capsule 3    folic acid (FOLVITE) 1 MG tablet Take 1 mg by mouth 2 times daily.       FLUoxetine (PROZAC) 20 MG capsule Take 20 mg by mouth 2 times daily.  levothyroxine (SYNTHROID) 100 MCG tablet Take 150 mcg by mouth Daily       simvastatin (ZOCOR) 10 MG tablet Take 10 mg by mouth nightly.  iron polysaccaride (FERREX 150 FORTE PLUS)  MG CAPS Take 1 capsule by mouth daily.  cyanocobalamin 1000 MCG/ML injection Inject 1,000 mcg into the muscle every 30 days.  hydroxychloroquine (PLAQUENIL) 200 MG tablet Take 200 mg by mouth 2 times daily.  cilostazol (PLETAL) 100 MG tablet Take 100 mg by mouth 2 times daily.  NONFORMULARY betamol drops, 1 drop in each eye twice a day      aspirin 81 MG tablet Take 81 mg by mouth daily. Latex, Codeine, and Vicodin [hydrocodone-acetaminophen]  Social History     Tobacco Use   Smoking Status Never Smoker   Smokeless Tobacco Never Used      (If patient a smoker, smoking cessation counseling offered)   Social History     Substance and Sexual Activity   Alcohol Use No       REVIEW OF SYSTEMS:  Constitutional: negative  Eyes: negative  Respiratory: negative  Cardiovascular: negative  Gastrointestinal: negative  Genitourinary: see HPI  Musculoskeletal: negative  Skin: negative   Neurological: negative  Hematological/Lymphatic: negative  Psychological: negative      Physical Exam:    This a [de-identified] y.o. female  Vitals:    03/19/21 1012   BP: 128/70   Pulse: 77   Temp: 97.3 °F (36.3 °C)     Body mass index is 35.99 kg/m². Constitutional: Patient in no acute distress;         Assessment and Plan        1. Mixed stress and urge urinary incontinence               Plan:      oab not at treatment goal- needs interstim rep to help reprogram  Follow up with rubi in 2 months after reprogramming. If not improved, may need botox      Prescriptions Ordered:  No orders of the defined types were placed in this encounter.      Orders Placed:  Orders Placed This Encounter   Procedures    POCT Urinalysis No Micro (Auto)            CELIO Lopez MD

## 2021-04-08 ENCOUNTER — HOSPITAL ENCOUNTER (OUTPATIENT)
Dept: GENERAL RADIOLOGY | Facility: CLINIC | Age: 81
Discharge: HOME | End: 2021-04-10
Payer: MEDICARE

## 2021-04-08 DIAGNOSIS — M17.12 ARTHRITIS OF LEFT KNEE: ICD-10-CM

## 2021-04-08 PROCEDURE — 73562 X-RAY EXAM OF KNEE 3: CPT

## 2021-05-27 ENCOUNTER — TELEPHONE (OUTPATIENT)
Dept: UROLOGY | Age: 81
End: 2021-05-27

## 2021-05-27 NOTE — TELEPHONE ENCOUNTER
pts daughter called in stating she no longer wants her mother to be seen at [address] [facility] she wishes to discontinue care for her mother.  But she is wondering if pt will need device removed

## 2024-01-31 ENCOUNTER — APPOINTMENT (OUTPATIENT)
Dept: VASCULAR LAB | Age: 84
End: 2024-01-31
Payer: MEDICARE

## 2024-01-31 ENCOUNTER — HOSPITAL ENCOUNTER (EMERGENCY)
Age: 84
Discharge: HOME | End: 2024-01-31
Attending: EMERGENCY MEDICINE
Payer: MEDICARE

## 2024-01-31 ENCOUNTER — APPOINTMENT (OUTPATIENT)
Dept: GENERAL RADIOLOGY | Age: 84
End: 2024-01-31
Payer: MEDICARE

## 2024-01-31 ENCOUNTER — APPOINTMENT (OUTPATIENT)
Dept: CT IMAGING | Age: 84
End: 2024-01-31
Payer: MEDICARE

## 2024-01-31 VITALS
TEMPERATURE: 97.6 F | SYSTOLIC BLOOD PRESSURE: 177 MMHG | HEART RATE: 75 BPM | BODY MASS INDEX: 35.36 KG/M2 | DIASTOLIC BLOOD PRESSURE: 79 MMHG | HEIGHT: 66 IN | OXYGEN SATURATION: 95 % | WEIGHT: 220 LBS | RESPIRATION RATE: 18 BRPM

## 2024-01-31 DIAGNOSIS — R41.82 ALTERED MENTAL STATUS, UNSPECIFIED ALTERED MENTAL STATUS TYPE: Primary | ICD-10-CM

## 2024-01-31 DIAGNOSIS — N39.0 URINARY TRACT INFECTION WITHOUT HEMATURIA, SITE UNSPECIFIED: ICD-10-CM

## 2024-01-31 LAB
ALBUMIN SERPL-MCNC: 3.8 G/DL (ref 3.5–5.2)
ALBUMIN/GLOB SERPL: 1.3 {RATIO} (ref 1–2.5)
ALP SERPL-CCNC: 76 U/L (ref 35–104)
ALT SERPL-CCNC: 15 U/L (ref 5–33)
ANION GAP SERPL CALCULATED.3IONS-SCNC: 11 MMOL/L (ref 9–17)
AST SERPL-CCNC: 17 U/L
BACTERIA URNS QL MICRO: ABNORMAL
BASOPHILS # BLD: <0.03 K/UL (ref 0–0.2)
BASOPHILS NFR BLD: 0 % (ref 0–2)
BILIRUB SERPL-MCNC: 0.3 MG/DL (ref 0.3–1.2)
BILIRUB UR QL STRIP: NEGATIVE
BUN BLD-MCNC: 29 MG/DL (ref 8–26)
BUN SERPL-MCNC: 26 MG/DL (ref 8–23)
CA-I BLD-SCNC: 1.24 MMOL/L (ref 1.15–1.33)
CALCIUM SERPL-MCNC: 9.2 MG/DL (ref 8.6–10.4)
CASTS #/AREA URNS LPF: ABNORMAL /LPF (ref 0–8)
CHLORIDE BLD-SCNC: 107 MMOL/L (ref 98–107)
CHLORIDE SERPL-SCNC: 102 MMOL/L (ref 98–107)
CLARITY UR: CLEAR
CO2 BLD CALC-SCNC: 26 MMOL/L (ref 22–30)
CO2 SERPL-SCNC: 22 MMOL/L (ref 20–31)
COLOR UR: YELLOW
CREAT SERPL-MCNC: 1.4 MG/DL (ref 0.5–0.9)
EGFR, POC: 34 ML/MIN/1.73M2
EOSINOPHIL # BLD: 0.19 K/UL (ref 0–0.44)
EOSINOPHILS RELATIVE PERCENT: 4 % (ref 1–4)
EPI CELLS #/AREA URNS HPF: ABNORMAL /HPF (ref 0–5)
ERYTHROCYTE [DISTWIDTH] IN BLOOD BY AUTOMATED COUNT: 13.4 % (ref 11.8–14.4)
FLUAV AG SPEC QL: NEGATIVE
FLUBV AG SPEC QL: NEGATIVE
GFR SERPL CREATININE-BSD FRML MDRD: 37 ML/MIN/1.73M2
GLUCOSE BLD-MCNC: 127 MG/DL (ref 74–100)
GLUCOSE SERPL-MCNC: 140 MG/DL (ref 70–99)
GLUCOSE UR STRIP-MCNC: NEGATIVE MG/DL
HCO3 VENOUS: 26.4 MMOL/L (ref 22–29)
HCT VFR BLD AUTO: 34.2 % (ref 36.3–47.1)
HCT VFR BLD AUTO: 35 % (ref 36–46)
HGB BLD-MCNC: 11.1 G/DL (ref 11.9–15.1)
HGB UR QL STRIP.AUTO: NEGATIVE
IMM GRANULOCYTES # BLD AUTO: <0.03 K/UL (ref 0–0.3)
IMM GRANULOCYTES NFR BLD: 0 %
KETONES UR STRIP-MCNC: NEGATIVE MG/DL
LACTIC ACID, WHOLE BLOOD: 1 MMOL/L (ref 0.7–2.1)
LEUKOCYTE ESTERASE UR QL STRIP: ABNORMAL
LIPASE SERPL-CCNC: 15 U/L (ref 13–60)
LYMPHOCYTES NFR BLD: 0.8 K/UL (ref 1.1–3.7)
LYMPHOCYTES RELATIVE PERCENT: 16 % (ref 24–43)
MCH RBC QN AUTO: 32.6 PG (ref 25.2–33.5)
MCHC RBC AUTO-ENTMCNC: 32.5 G/DL (ref 28.4–34.8)
MCV RBC AUTO: 100.6 FL (ref 82.6–102.9)
MONOCYTES NFR BLD: 0.58 K/UL (ref 0.1–1.2)
MONOCYTES NFR BLD: 12 % (ref 3–12)
NEUTROPHILS NFR BLD: 68 % (ref 36–65)
NEUTS SEG NFR BLD: 3.46 K/UL (ref 1.5–8.1)
NITRITE UR QL STRIP: POSITIVE
NRBC BLD-RTO: 0 PER 100 WBC
O2 SAT, VEN: 68.1 % (ref 60–85)
PCO2, VEN: 44 MM HG (ref 41–51)
PH UR STRIP: 7 [PH] (ref 5–8)
PH VENOUS: 7.39 (ref 7.32–7.43)
PLATELET # BLD AUTO: 203 K/UL (ref 138–453)
PMV BLD AUTO: 10.1 FL (ref 8.1–13.5)
PO2, VEN: 36.1 MM HG (ref 30–50)
POC ANION GAP: 8 MMOL/L (ref 7–16)
POC CREATININE: 1.5 MG/DL (ref 0.51–1.19)
POC HEMOGLOBIN (CALC): 11.9 G/DL (ref 12–16)
POC LACTIC ACID: 0.6 MMOL/L (ref 0.56–1.39)
POSITIVE BASE EXCESS, VEN: 1.1 MMOL/L (ref 0–3)
POTASSIUM BLD-SCNC: 4.9 MMOL/L (ref 3.5–4.5)
POTASSIUM SERPL-SCNC: 4.9 MMOL/L (ref 3.7–5.3)
PROT SERPL-MCNC: 6.7 G/DL (ref 6.4–8.3)
PROT UR STRIP-MCNC: NEGATIVE MG/DL
RBC # BLD AUTO: 3.4 M/UL (ref 3.95–5.11)
RBC #/AREA URNS HPF: ABNORMAL /HPF (ref 0–4)
SARS-COV-2 RDRP RESP QL NAA+PROBE: NOT DETECTED
SODIUM BLD-SCNC: 140 MMOL/L (ref 138–146)
SODIUM SERPL-SCNC: 135 MMOL/L (ref 135–144)
SP GR UR STRIP: 1.01 (ref 1–1.03)
SPECIMEN DESCRIPTION: NORMAL
T4 FREE SERPL-MCNC: 1.1 NG/DL (ref 0.9–1.7)
TROPONIN I SERPL HS-MCNC: 28 NG/L (ref 0–14)
TROPONIN I SERPL HS-MCNC: 30 NG/L (ref 0–14)
TSH SERPL DL<=0.05 MIU/L-ACNC: 14.6 UIU/ML (ref 0.3–5)
UROBILINOGEN UR STRIP-ACNC: NORMAL EU/DL (ref 0–1)
WBC #/AREA URNS HPF: ABNORMAL /HPF (ref 0–5)
WBC OTHER # BLD: 5.1 K/UL (ref 3.5–11.3)

## 2024-01-31 PROCEDURE — 87186 SC STD MICRODIL/AGAR DIL: CPT

## 2024-01-31 PROCEDURE — 87088 URINE BACTERIA CULTURE: CPT

## 2024-01-31 PROCEDURE — 87804 INFLUENZA ASSAY W/OPTIC: CPT

## 2024-01-31 PROCEDURE — 96372 THER/PROPH/DIAG INJ SC/IM: CPT

## 2024-01-31 PROCEDURE — 70450 CT HEAD/BRAIN W/O DYE: CPT

## 2024-01-31 PROCEDURE — 82565 ASSAY OF CREATININE: CPT

## 2024-01-31 PROCEDURE — 84443 ASSAY THYROID STIM HORMONE: CPT

## 2024-01-31 PROCEDURE — 84439 ASSAY OF FREE THYROXINE: CPT

## 2024-01-31 PROCEDURE — 93971 EXTREMITY STUDY: CPT

## 2024-01-31 PROCEDURE — 6360000002 HC RX W HCPCS: Performed by: STUDENT IN AN ORGANIZED HEALTH CARE EDUCATION/TRAINING PROGRAM

## 2024-01-31 PROCEDURE — 87086 URINE CULTURE/COLONY COUNT: CPT

## 2024-01-31 PROCEDURE — 84520 ASSAY OF UREA NITROGEN: CPT

## 2024-01-31 PROCEDURE — 83690 ASSAY OF LIPASE: CPT

## 2024-01-31 PROCEDURE — 82947 ASSAY GLUCOSE BLOOD QUANT: CPT

## 2024-01-31 PROCEDURE — 81001 URINALYSIS AUTO W/SCOPE: CPT

## 2024-01-31 PROCEDURE — 85014 HEMATOCRIT: CPT

## 2024-01-31 PROCEDURE — 82330 ASSAY OF CALCIUM: CPT

## 2024-01-31 PROCEDURE — 87635 SARS-COV-2 COVID-19 AMP PRB: CPT

## 2024-01-31 PROCEDURE — 80051 ELECTROLYTE PANEL: CPT

## 2024-01-31 PROCEDURE — 82803 BLOOD GASES ANY COMBINATION: CPT

## 2024-01-31 PROCEDURE — 83605 ASSAY OF LACTIC ACID: CPT

## 2024-01-31 PROCEDURE — 2580000003 HC RX 258: Performed by: STUDENT IN AN ORGANIZED HEALTH CARE EDUCATION/TRAINING PROGRAM

## 2024-01-31 PROCEDURE — 84484 ASSAY OF TROPONIN QUANT: CPT

## 2024-01-31 PROCEDURE — 85025 COMPLETE CBC W/AUTO DIFF WBC: CPT

## 2024-01-31 PROCEDURE — 96365 THER/PROPH/DIAG IV INF INIT: CPT

## 2024-01-31 PROCEDURE — 93005 ELECTROCARDIOGRAM TRACING: CPT | Performed by: STUDENT IN AN ORGANIZED HEALTH CARE EDUCATION/TRAINING PROGRAM

## 2024-01-31 PROCEDURE — 99285 EMERGENCY DEPT VISIT HI MDM: CPT

## 2024-01-31 PROCEDURE — 71045 X-RAY EXAM CHEST 1 VIEW: CPT

## 2024-01-31 PROCEDURE — 80053 COMPREHEN METABOLIC PANEL: CPT

## 2024-01-31 RX ORDER — CEPHALEXIN 500 MG/1
500 CAPSULE ORAL 2 TIMES DAILY
Qty: 14 CAPSULE | Refills: 0 | Status: SHIPPED | OUTPATIENT
Start: 2024-01-31 | End: 2024-02-07

## 2024-01-31 RX ORDER — HALOPERIDOL 5 MG/ML
2.5 INJECTION INTRAMUSCULAR EVERY 4 HOURS PRN
Status: DISCONTINUED | OUTPATIENT
Start: 2024-01-31 | End: 2024-02-01 | Stop reason: HOSPADM

## 2024-01-31 RX ORDER — HALOPERIDOL 5 MG/ML
2.5 INJECTION INTRAMUSCULAR
Status: COMPLETED | OUTPATIENT
Start: 2024-01-31 | End: 2024-01-31

## 2024-01-31 RX ADMIN — HALOPERIDOL LACTATE 2.5 MG: 5 INJECTION, SOLUTION INTRAMUSCULAR at 17:49

## 2024-01-31 RX ADMIN — CEFTRIAXONE SODIUM 1000 MG: 1 INJECTION, POWDER, FOR SOLUTION INTRAMUSCULAR; INTRAVENOUS at 20:29

## 2024-01-31 ASSESSMENT — PAIN - FUNCTIONAL ASSESSMENT: PAIN_FUNCTIONAL_ASSESSMENT: NONE - DENIES PAIN

## 2024-01-31 NOTE — ED NOTES
Pt arrives to ED 15 via ems with a chief complaint of altered mental status   Daughter called EMS   Ems reported that the daughter told them that over the past three days her dementia has worsened   Pt is A&O x1, is unable to answer other orientation question  Pt intermittently follows commands but needs frequent reorientation   Pt agitated and aggressive with staff, yells, and does not want staff care   Pt placed on cardiac monitor, continuous pulse ox, and BP cuff.  RR even and unlabored.   NAD noted.   Whiteboard updated.  Will continue with plan of care.

## 2024-01-31 NOTE — ED PROVIDER NOTES
[facility]     Emergency Department     Faculty Attestation    I performed a history and physical examination of the patient and discussed management with the resident. I reviewed the resident´s note and agree with the documented findings and plan of care. Any areas of disagreement are noted on the chart. I was personally present for the key portions of any procedures. I have documented in the chart those procedures where I was not present during the key portions. I have reviewed the emergency nurses triage note. I agree with the chief complaint, past medical history, past surgical history, allergies, medications, social and family history as documented unless otherwise noted below. For Physician Assistant/ Nurse Practitioner cases/documentation I have personally evaluated this patient and have completed at least one if not all key elements of the E/M (history, physical exam, and MDM). Additional findings are as noted.    Brought in by the patient's daughter because of change in mental status for the past 3 days, daughter states patient was coughing a lot during the night.  Patient is awake and alert following commands, scattered expiratory wheezes, heart regular rate and rhythm, abdomen nontender, swelling and pain left calf with bruising anteriorly.       EKG Interpretation    Interpreted by emergency department physician    Rhythm: normal sinus with sinus arrhythmia  Rate: normal/77  Axis: normal 1  Ectopy: none  Conduction: normal  ST Segments: no acute change  T Waves: no acute change  Q Waves: none    Clinical Impression: Normal EKG    Gary Horton, III     Gary Horton MD  01/31/24 0060

## 2024-01-31 NOTE — ED NOTES
At this time daughter (celestino) arrives in room  Writer spoke to daughter more about what has been going on with pt   Daughter states that the past three days she has not been sleeping well   Daughter also states that she has been having hallucinations for the past three days

## 2024-01-31 NOTE — ED NOTES
Labeled blood specimens sent to lab via tube system.    [x] Lavender   [] on ice   [x] Blue   [x] Green/yellow  [x] Green/black [] on ice  [] Pink  [] Red  [] Yellow  [] on ice  [] Blood Cultures  [] x1 [] x2    Labeled COVID swab sent to lab via tube system.    COVID-19 swab      [x] Rapid   [] Non- Rapid/PCR  [] Respiratory Panel with COVID    Labeled flu swab sent to lab via tube system.

## 2024-01-31 NOTE — ED PROVIDER NOTES
[facility] ED  Emergency Department Encounter  Emergency Medicine Resident     Pt Name:Chapis Dupree  MRN: [id number]  Birthdate 1940  Date of evaluation: 1/31/24  PCP:  Matthew Macedo MD  Note Started: 4:15 PM EST      CHIEF COMPLAINT       Chief Complaint   Patient presents with    Altered Mental Status       HISTORY OF PRESENT ILLNESS  (Location/Symptom, Timing/Onset, Context/Setting, Quality, Duration, Modifying Factors, Severity.)      Chapis Dupree is a 83 y.o. female who presents with altered mental status.  Per the daughter, patient has been slightly altered from baseline.  Patient has baseline dementia.  She is not mobile.  She lives at home.  Patient is unable to provide any additional history.  She does not appear in acute distress however she is morbidly obese.  The patient is not on any anticoagulation on chart review, she has a history of urinary incontinence in the chart    PAST MEDICAL / SURGICAL / SOCIAL / FAMILY HISTORY      has a past medical history of Anemia, Anticardiolipin antibody positive, Antiphospholipid antibody positive, Anxiety, Colon polyps, Depression, Dermatitis, Eczema, Glaucoma, Hypergranulation, Hypothyroidism, Macular degeneration, Memory difficulty, OAB (overactive bladder), Osteoarthritis, Poor historian, Primary hypercoagulable state (HCC), Pyoderma gangrenosa, Urinary incontinence, Wears dentures, Wears glasses, Wellness examination, and Wellness examination.       has a past surgical history that includes hernia repair; knee surgery; Total knee arthroplasty (Right, 2018); Umbilical hernia repair; Cystography (N/A, 8/18/2020); Cystoscopy (8/18/2020); Colonoscopy (2020); Nerve Surgery (10/26/2020); Nerve Surgery (N/A, 10/26/2020); Stimulator Surgery (11/09/2020); and Nerve Surgery (N/A, 11/9/2020).      Social History     Socioeconomic History    Marital status: Single     Spouse name: Not on file    Number of children: Not on file     TO:  [facility] ED  [address] [facility] [address]  [phone]  Go to   As needed    Matthew Macedo MD  [address]  [place] [address] [phone]    Schedule an appointment as soon as possible for a visit in 3 days        DISCHARGE MEDICATIONS:  Discharge Medication List as of 1/31/2024  9:53 PM        START taking these medications    Details   cephALEXin (KEFLEX) 500 MG capsule Take 1 capsule by mouth 2 times daily for 7 days, Disp-14 capsule, R-0Print             Chad Lr DO  Emergency Medicine Resident    (Please note that portions of this note were completed with a voice recognition program.  Efforts were made to edit the dictations but occasionally words are mis-transcribed.)

## 2024-02-01 LAB
ECHO BSA: 2.16 M2
EKG ATRIAL RATE: 77 BPM
EKG P AXIS: 36 DEGREES
EKG P-R INTERVAL: 206 MS
EKG Q-T INTERVAL: 430 MS
EKG QRS DURATION: 104 MS
EKG QTC CALCULATION (BAZETT): 486 MS
EKG R AXIS: 1 DEGREES
EKG T AXIS: 77 DEGREES
EKG VENTRICULAR RATE: 77 BPM

## 2024-02-01 PROCEDURE — 93010 ELECTROCARDIOGRAM REPORT: CPT | Performed by: INTERNAL MEDICINE

## 2024-02-01 PROCEDURE — 93971 EXTREMITY STUDY: CPT | Performed by: SURGERY

## 2024-02-01 NOTE — PROGRESS NOTES
[facility]  PROGRESS NOTE    Shift date: 1/31/2024  Shift day: Wednesday   Shift # 2    Room # 15/15   Name: Chapis Dupree                Scientologist:  [employer] day  [employer]:     Referral: Routine Visit    Admit Date & Time: 1/31/2024  4:00 PM    Assessment:  Chapis Dupree is a 83 y.o. female in the hospital       Intervention:  Writer introduced self and title as . Patient did not appear to mind  presence and engaged in conversation. Patient had family present in room for additional support while on campus. Patient appeared coping and hopeful when discussing her hospital visit.  provided a supportive presence through active listening and words of affirmation.    Outcome:  Patient continues coping with her suzan and hospital visit.    Plan:  Chaplains will remain available to offer spiritual and emotional support as needed.      Electronically signed by Chaplain Nancy, on 1/31/2024 at 9:33 PM.  [facility]  [facility]  [phone]

## 2024-02-01 NOTE — DISCHARGE INSTRUCTIONS
You have been seen in the ER today for altered mental status. You have a urinary tract infection.  If you begin to experience any symptoms such as chest pain shortness of breath nausea vomiting dizziness drowsiness abdominal pain loss of consciousness or any other symptoms you find concerning please return to the ED for follow-up evaluation.  If you have been given pain medication please take them only as prescribed. Do not take more medication than prescribed at any given time.  Please follow-up with your primary care provider within 3-5 days for continued care, sooner if you have concerns.

## 2024-02-02 LAB
MICROORGANISM SPEC CULT: ABNORMAL
SPECIMEN DESCRIPTION: ABNORMAL

## 2024-02-03 NOTE — PROGRESS NOTES
Reviewed patient's urine culture - culture positive for Ecoli.  Patient was discharged on cephalexin, and culture is sensitive to prescribed medication.  Antibiotic prescribed at discharge is appropriate - no changes made to antibiotic regimen.     Chalino Santana Pharm.D., [facility]

## (undated) DEVICE — DRAPE,REIN 53X77,STERILE: Brand: MEDLINE

## (undated) DEVICE — SUTURE MCRYL SZ 4-0 L18IN ABSRB UD L16MM PC-3 3/8 CIR PRIM Y845G

## (undated) DEVICE — ELECTRODE EMG GEL PTCH W/ WIRE NEOTRODE II URODYN

## (undated) DEVICE — SUTURE VIC + SH-13-0 27IN  VCP311H

## (undated) DEVICE — ADHESIVE SKIN CLSR 0.7ML TOP DERMBND ADV

## (undated) DEVICE — SVMMC PEDS/UROLOGY MINOR PACK: Brand: MEDLINE INDUSTRIES, INC.

## (undated) DEVICE — Z DUP USE 2522782 SOLUTION IRRIG 1000ML STRL H2O PLAS CONTAINER UROMATIC

## (undated) DEVICE — GLOVE ORTHO 7 1/2   MSG9475

## (undated) DEVICE — DRAPE CAM W7XL46IN FOR CLS SYS CAM

## (undated) DEVICE — NEUROSTIMULATOR EXT SM LTWT SGL BTTN H2O RESIST WIRELESS

## (undated) DEVICE — GLOVE SURG SZ 6 THK91MIL LTX FREE SYN POLYISOPRENE ANTI

## (undated) DEVICE — MAT FLOOR ULTRA ABS 28X48IN

## (undated) DEVICE — CATH SINGLE SENSOR COUDE TIP

## (undated) DEVICE — GAUZE,SPONGE,FLUFF,6"X6.75",STRL,5/TRAY: Brand: MEDLINE

## (undated) DEVICE — Z INACTIVE USE 2527070 DRAPE SURG W40XL44IN UNDERBUTTOCK SMS POLYPR W/ PCH BK DISP

## (undated) DEVICE — ELECTRODE PT RET AD L9FT HI MOIST COND ADH HYDRGEL CORDED

## (undated) DEVICE — 3M™ STERI-STRIP™ COMPOUND BENZOIN TINCTURE 40 BAGS/CARTON 4 CARTONS/CASE C1544: Brand: 3M™ STERI-STRIP™

## (undated) DEVICE — GLOVE SURG SZ 65 THK91MIL LTX FREE SYN POLYISOPRENE

## (undated) DEVICE — Device: Brand: PUMP TUBING INFUSION LINE

## (undated) DEVICE — PROTECTOR ULN NRV PUR FOAM HK LOOP STRP ANATOMICALLY

## (undated) DEVICE — SHEET, T, LAPAROTOMY, STERILE: Brand: MEDLINE

## (undated) DEVICE — ASPIRATOR FLR L72IN SUCT TB W/ 1 DETACH FISH STK PUSH HNDL

## (undated) DEVICE — CATHETER URODYN AIR CHARGED ABD SENSOR

## (undated) DEVICE — C-ARMOR C-ARM EQUIPMENT COVERS CLEAR STERILE UNIVERSAL FIT 12 PER CASE: Brand: C-ARMOR

## (undated) DEVICE — DRESSING TRNSPAR W5XL4.5IN FLM SHT SEMIPERMEABLE WIND

## (undated) DEVICE — INTENDED FOR TISSUE SEPARATION, AND OTHER PROCEDURES THAT REQUIRE A SHARP SURGICAL BLADE TO PUNCTURE OR CUT.: Brand: BARD-PARKER ® CARBON RIB-BACK BLADES

## (undated) DEVICE — SUTURE VCRL SZ 3-0 L27IN ABSRB UD L26MM SH 1/2 CIR J416H

## (undated) DEVICE — Z DISCONTINUED BY MEDLINE USE 2711682 TRAY SKIN PREP DRY W/ PREM GLV

## (undated) DEVICE — DRAPE C ARM UNIV W41XL74IN CLR PLAS XR VELC CLSR POLY STRP

## (undated) DEVICE — SOLUTION SCRB 4OZ 4% CHG H2O AIDED FOR PREOPERATIVE SKIN

## (undated) DEVICE — 3M™ IOBAN™ 2 ANTIMICROBIAL INCISE DRAPE 6650EZ: Brand: IOBAN™ 2

## (undated) DEVICE — PACK PROCEDURE SURG CYSTO SVMMC LF